# Patient Record
Sex: FEMALE
[De-identification: names, ages, dates, MRNs, and addresses within clinical notes are randomized per-mention and may not be internally consistent; named-entity substitution may affect disease eponyms.]

---

## 2017-12-07 LAB
HPV INTERPRETATION - HISTORICAL: NORMAL
HPV INTERPRETER - HISTORICAL: NORMAL

## 2018-01-10 ENCOUNTER — RECORDS - HEALTHEAST (OUTPATIENT)
Dept: ADMINISTRATIVE | Facility: OTHER | Age: 48
End: 2018-01-10

## 2018-01-10 LAB
BKR LAB AP ABNORMAL BLEEDING: NO
BKR LAB AP BIRTH CONTROL/HORMONES: ABNORMAL
BKR LAB AP CERVICAL APPEARANCE: NORMAL
BKR LAB AP GYN ADEQUACY: ABNORMAL
BKR LAB AP GYN INTERPRETATION: ABNORMAL
BKR LAB AP HPV REFLEX: ABNORMAL
BKR LAB AP LMP: 2015
BKR LAB AP PATIENT STATUS: ABNORMAL
BKR LAB AP PREVIOUS ABNORMAL: ABNORMAL
BKR LAB AP PREVIOUS NORMAL: 15
HIGH RISK?: YES
PATH REPORT.ADDENDUM SPEC: ABNORMAL
PATH REPORT.COMMENTS IMP SPEC: ABNORMAL
RESULT FLAG (HE HISTORICAL CONVERSION): ABNORMAL

## 2018-06-13 NOTE — PROGRESS NOTES
Jonatan Velez is a 46year old female who presents as a new patient to establish:       Patient has no acute complaints. Was previously seen by EMG, last in 2014. Moved to [place] and now has relocated back to [place]. Wants to re-establish.  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 Meds & Refills for this Visit:  Signed Prescriptions Disp Refills    Sertraline HCl 100 MG Oral Tab 180 tablet 1      Sig: Take 2 tablets (200 mg total) by mouth daily.            Imaging & Consults:

## 2018-09-11 ENCOUNTER — OFFICE VISIT (OUTPATIENT)
Dept: INTERNAL MEDICINE CLINIC | Facility: CLINIC | Age: 48
End: 2018-09-11
Payer: MEDICAID

## 2018-09-11 VITALS
WEIGHT: 142.38 LBS | RESPIRATION RATE: 14 BRPM | OXYGEN SATURATION: 98 % | SYSTOLIC BLOOD PRESSURE: 112 MMHG | HEART RATE: 82 BPM | BODY MASS INDEX: 24.31 KG/M2 | DIASTOLIC BLOOD PRESSURE: 70 MMHG | TEMPERATURE: 99 F | HEIGHT: 64 IN

## 2018-09-11 DIAGNOSIS — R10.9 RIGHT FLANK PAIN: Primary | ICD-10-CM

## 2018-09-11 DIAGNOSIS — M54.50 ACUTE RIGHT-SIDED LOW BACK PAIN WITHOUT SCIATICA: ICD-10-CM

## 2018-09-11 PROCEDURE — 99213 OFFICE O/P EST LOW 20 MIN: CPT | Performed by: NURSE PRACTITIONER

## 2018-09-11 RX ORDER — CYCLOBENZAPRINE HCL 10 MG
10 TABLET ORAL NIGHTLY PRN
Qty: 10 TABLET | Refills: 0 | Status: SHIPPED | OUTPATIENT
Start: 2018-09-11 | End: 2018-09-25

## 2018-09-11 NOTE — PROGRESS NOTES
Patient presents with:  Back Pain: comes and goes, does heat and ice, has muscle spasms, lower back now into right side.  Did start a active job but had to quit possible cause, a lot of heavy lifting      HPI:  Presents with approx history of intermittent r Anxiety     IBS (irritable bowel syndrome)     Depression     Lactose intolerance     Hemochromatosis     Lateral epicondylitis  of elbow     Body mass index between 19-24, adult     Cervical dysplasia        Current Outpatient Medications:  Cyclobenzaprin pain without sciatica- as above    No orders of the defined types were placed in this encounter.       Meds & Refills for this Visit:  Requested Prescriptions     Signed Prescriptions Disp Refills   • Cyclobenzaprine HCl 10 MG Oral Tab 10 tablet 0     Sig:

## 2018-09-11 NOTE — PATIENT INSTRUCTIONS
Take one cyclobenzaprine tab nightly, before bed, for three (3) nights. Then make take nightly as needed. This medication will likely make you drowsy. Do not drive, operate machinery or make important decisions after taking this medication.  D

## 2019-05-14 ENCOUNTER — OFFICE VISIT (OUTPATIENT)
Dept: INTERNAL MEDICINE CLINIC | Facility: CLINIC | Age: 49
End: 2019-05-14
Payer: COMMERCIAL

## 2019-05-14 VITALS
SYSTOLIC BLOOD PRESSURE: 106 MMHG | OXYGEN SATURATION: 99 % | WEIGHT: 136.19 LBS | HEIGHT: 64 IN | DIASTOLIC BLOOD PRESSURE: 72 MMHG | BODY MASS INDEX: 23.25 KG/M2 | RESPIRATION RATE: 14 BRPM | HEART RATE: 75 BPM | TEMPERATURE: 99 F

## 2019-05-14 DIAGNOSIS — M25.511 ACUTE PAIN OF RIGHT SHOULDER: ICD-10-CM

## 2019-05-14 DIAGNOSIS — R22.31 SUBCUTANEOUS MASS OF RIGHT THUMB: ICD-10-CM

## 2019-05-14 DIAGNOSIS — K05.219 ABSCESS OF UPPER GUM: Primary | ICD-10-CM

## 2019-05-14 PROCEDURE — 99214 OFFICE O/P EST MOD 30 MIN: CPT | Performed by: NURSE PRACTITIONER

## 2019-05-14 RX ORDER — AMOXICILLIN AND CLAVULANATE POTASSIUM 875; 125 MG/1; MG/1
1 TABLET, FILM COATED ORAL 2 TIMES DAILY
Qty: 20 TABLET | Refills: 0 | Status: SHIPPED | OUTPATIENT
Start: 2019-05-14 | End: 2019-05-24

## 2019-05-14 NOTE — PROGRESS NOTES
Patient presents with:  Mouth/Lip Problem: abcess in gums and drainage, couldn't get into Dentist for weeks  Lump: Right thumb  Shoulder Pain: Right side      HPI:  Presents with approx 5 day history of right upper gum pain and mass with yellow colored debora 6/20/07   • Hemochromatosis 2007    homozygous for C282Y mutation   • IBS (irritable bowel syndrome) 9/24/2003   • Labyrinthitis 1/6/2000   • Lactose intolerance    • Lateral epicondylitis 2/3/96    R   • Lipid screening +2/9/11   • Palpitations 3/3/06, 1/ shoulder w/o erythema, edema, masses, TTP or obvious deformity. Internal rotation and empty can test negative to increase pain. Ext: Right thumb noted with firm, minimally tender mass to dorsal surface of digit at DIP joint area. ROM WNL to thumb.  No trudy

## 2019-10-21 ENCOUNTER — OFFICE VISIT (OUTPATIENT)
Dept: INTERNAL MEDICINE CLINIC | Facility: CLINIC | Age: 49
End: 2019-10-21
Payer: COMMERCIAL

## 2019-10-21 VITALS
TEMPERATURE: 99 F | HEIGHT: 64 IN | HEART RATE: 81 BPM | WEIGHT: 137.19 LBS | BODY MASS INDEX: 23.42 KG/M2 | RESPIRATION RATE: 14 BRPM | OXYGEN SATURATION: 97 % | SYSTOLIC BLOOD PRESSURE: 110 MMHG | DIASTOLIC BLOOD PRESSURE: 64 MMHG

## 2019-10-21 DIAGNOSIS — Z13.228 SCREENING FOR METABOLIC DISORDER: ICD-10-CM

## 2019-10-21 DIAGNOSIS — Z13.220 SCREENING FOR LIPOID DISORDERS: ICD-10-CM

## 2019-10-21 DIAGNOSIS — Z13.29 SCREENING FOR THYROID DISORDER: ICD-10-CM

## 2019-10-21 DIAGNOSIS — M18.11 ARTHRITIS OF CARPOMETACARPAL (CMC) JOINT OF RIGHT THUMB: Primary | ICD-10-CM

## 2019-10-21 DIAGNOSIS — Z23 NEED FOR VACCINATION: ICD-10-CM

## 2019-10-21 DIAGNOSIS — Z12.11 COLON CANCER SCREENING: ICD-10-CM

## 2019-10-21 DIAGNOSIS — Z13.1 SCREENING FOR DIABETES MELLITUS: ICD-10-CM

## 2019-10-21 DIAGNOSIS — Z13.0 SCREENING FOR IRON DEFICIENCY ANEMIA: ICD-10-CM

## 2019-10-21 PROCEDURE — 99214 OFFICE O/P EST MOD 30 MIN: CPT | Performed by: STUDENT IN AN ORGANIZED HEALTH CARE EDUCATION/TRAINING PROGRAM

## 2019-10-21 PROCEDURE — 90686 IIV4 VACC NO PRSV 0.5 ML IM: CPT | Performed by: STUDENT IN AN ORGANIZED HEALTH CARE EDUCATION/TRAINING PROGRAM

## 2019-10-21 PROCEDURE — 90471 IMMUNIZATION ADMIN: CPT | Performed by: STUDENT IN AN ORGANIZED HEALTH CARE EDUCATION/TRAINING PROGRAM

## 2019-10-21 NOTE — PROGRESS NOTES
HPI:    Patient ID: Avelina Hair is a 44year old female. This is a new patient to me. Patient's medications, allergies, past medical, surgical, social and family histories were reviewed and updated as appropriate.     Joint Pain   This is a chronic Mouth/Throat: Oropharynx is clear and moist.   Eyes: Pupils are equal, round, and reactive to light. Conjunctivae and EOM are normal.   Neck: Normal range of motion. Neck supple.    Cardiovascular: Normal rate, regular rhythm, normal heart sounds and intact

## 2019-10-21 NOTE — PATIENT INSTRUCTIONS
Understanding Carpometacarpal Osteoarthritis     The base of the thumb where it meets the hand is called the carpometacarpal [place]) joint. This joint lets the thumb move freely in many directions. It also provides strength so the hand can grasp and . for a time  If these treatments don’t do enough to relieve severe pain, you may need surgery. There are several different types of surgery. In general, the goal is to ease pain and help you to be able to use the hand.   When to call your healthcare provider weight puts a lot of extra strain on weight-bearing joints of the lower back, hips, knees, feet and ankles.  If you are overweight, talk to your healthcare provider about a safe and effective weight loss program.  · Use anti-inflammatory medicines as prescr

## 2019-10-25 ENCOUNTER — TELEPHONE (OUTPATIENT)
Dept: INTERNAL MEDICINE CLINIC | Facility: CLINIC | Age: 49
End: 2019-10-25

## 2019-10-28 NOTE — TELEPHONE ENCOUNTER
Received fax requesting prior authorization for diclofenac gel. Unable to complete ePA. Called patient to obtain Rx coverage information, patient states she paid out of pocket for prescription, and prior authorization is not needed.

## 2019-12-26 ENCOUNTER — HOSPITAL ENCOUNTER (OUTPATIENT)
Age: 49
Discharge: HOME | End: 2019-12-26
Attending: FAMILY MEDICINE

## 2019-12-26 VITALS
HEART RATE: 104 BPM | RESPIRATION RATE: 18 BRPM | WEIGHT: 140 LBS | OXYGEN SATURATION: 99 % | SYSTOLIC BLOOD PRESSURE: 100 MMHG | HEIGHT: 64 IN | TEMPERATURE: 98 F | BODY MASS INDEX: 23.9 KG/M2 | DIASTOLIC BLOOD PRESSURE: 62 MMHG

## 2019-12-26 DIAGNOSIS — K52.9 GASTROENTERITIS: ICD-10-CM

## 2019-12-26 DIAGNOSIS — B00.9 HERPES SIMPLEX INFECTION OF SKIN: Primary | ICD-10-CM

## 2019-12-26 PROCEDURE — 99213 OFFICE O/P EST LOW 20 MIN: CPT

## 2019-12-26 PROCEDURE — 99204 OFFICE O/P NEW MOD 45 MIN: CPT

## 2019-12-26 RX ORDER — ONDANSETRON 8 MG/1
8 TABLET, ORALLY DISINTEGRATING ORAL EVERY 12 HOURS PRN
Qty: 10 TABLET | Refills: 0 | Status: SHIPPED | OUTPATIENT
Start: 2019-12-26 | End: 2020-01-05

## 2019-12-26 RX ORDER — VALACYCLOVIR HYDROCHLORIDE 1 G/1
1 TABLET, FILM COATED ORAL 3 TIMES DAILY
Qty: 21 TABLET | Refills: 0 | Status: SHIPPED | OUTPATIENT
Start: 2019-12-26 | End: 2020-01-02

## 2019-12-26 RX ORDER — ONDANSETRON 4 MG/1
8 TABLET, ORALLY DISINTEGRATING ORAL ONCE
Status: COMPLETED | OUTPATIENT
Start: 2019-12-26 | End: 2019-12-26

## 2019-12-26 NOTE — ED PROVIDER NOTES
Patient Seen in: [facility]      History   Patient presents with:  Rash Skin Problem  Vomiting    Stated Complaint: poss shingles    HPI    *57-year-old female presents to the immediate care today with chief complaints  1. Past Surgical History:   Procedure Laterality Date   • DENTAL SURGERY PROCEDURE      wisdom teeth   • LOOP ELECTRO SURGICAL EXCISION N/A 6/5/2014    Performed by Aguilar Roberson MD at [facility] MAIN OR   • OTHER SURGICAL HISTORY  5/2003    Colpos normal, no murmur, click, rub or gallop, regular rate and rhythm  Abdomen: soft, non-tender; bowel sounds normal; no masses,  no organomegaly  Extremities: extremities normal, atraumatic, no cyanosis or edema  Pulses: 2+ and symmetric  Skin:    Description Prescribed:  Current Discharge Medication List    START taking these medications    valACYclovir HCl 1 G Oral Tab  Take 1 tablet (1,000 mg total) by mouth 3 (three) times daily for 7 days.   Qty: 21 tablet Refills: 0    ondansetron 8 MG Oral Tablet Dispersi

## 2019-12-26 NOTE — ED INITIAL ASSESSMENT (HPI)
The patient is here for possible shingles to the right cheek/ear area. She states earlier in the week she started with tingling to the right side of the face and head and states it has gone down her right side.   Yesterday the rash showed up and she states

## 2020-03-15 ENCOUNTER — PATIENT MESSAGE (OUTPATIENT)
Dept: INTERNAL MEDICINE CLINIC | Facility: CLINIC | Age: 50
End: 2020-03-15

## 2020-03-15 DIAGNOSIS — F32.A DEPRESSION, UNSPECIFIED DEPRESSION TYPE: Primary | ICD-10-CM

## 2020-03-16 RX ORDER — SERTRALINE HYDROCHLORIDE 100 MG/1
200 TABLET, FILM COATED ORAL
Qty: 180 TABLET | Refills: 0 | Status: SHIPPED | OUTPATIENT
Start: 2020-03-16 | End: 2020-06-16

## 2020-03-16 NOTE — TELEPHONE ENCOUNTER
Last OV relevant to medication: 10/21/19  Last refill date: 11/4/19     #/refills: 180/0  When pt was asked to return for OV: 2 months  Upcoming appt/reason: none    From: Keira Valdes  To: Rebel Mancilla MD  Sent: 3/15/2020  1:17 PM CDT  Subject: Leatha Cadet

## 2020-03-31 ENCOUNTER — TELEPHONE (OUTPATIENT)
Dept: INTERNAL MEDICINE CLINIC | Facility: CLINIC | Age: 50
End: 2020-03-31

## 2020-03-31 ENCOUNTER — VIRTUAL PHONE E/M (OUTPATIENT)
Dept: INTERNAL MEDICINE CLINIC | Facility: CLINIC | Age: 50
End: 2020-03-31
Payer: COMMERCIAL

## 2020-03-31 DIAGNOSIS — Z02.9 ADMINISTRATIVE ENCOUNTER: Primary | ICD-10-CM

## 2020-03-31 DIAGNOSIS — A60.00 GENITAL HERPES SIMPLEX, UNSPECIFIED SITE: Primary | ICD-10-CM

## 2020-03-31 PROCEDURE — 99213 OFFICE O/P EST LOW 20 MIN: CPT | Performed by: STUDENT IN AN ORGANIZED HEALTH CARE EDUCATION/TRAINING PROGRAM

## 2020-03-31 NOTE — TELEPHONE ENCOUNTER
Virtual/Telephone Check-In    Julienne Villarreal verbally consents to a Virtual/Telephone Check-In service on 03/31/20. Patient understands and accepts financial responsibility for any deductible, co-insurance and/or co-pays associated with this service. directly to an [facility] or ED or MD office.   - Advised staying home when sick. - Recommended to practice social distancing and staying 6 ft away from other individuals.   - Educated on wearing a mask when around others if having respiratory symptoms of cough,

## 2020-04-23 ENCOUNTER — E-VISIT (OUTPATIENT)
Dept: FAMILY MEDICINE CLINIC | Facility: CLINIC | Age: 50
End: 2020-04-23

## 2020-04-23 DIAGNOSIS — J02.9 PHARYNGITIS, UNSPECIFIED ETIOLOGY: Primary | ICD-10-CM

## 2020-04-23 DIAGNOSIS — H92.09 OTALGIA, UNSPECIFIED LATERALITY: ICD-10-CM

## 2020-04-23 PROCEDURE — 99422 OL DIG E/M SVC 11-20 MIN: CPT | Performed by: NURSE PRACTITIONER

## 2020-04-23 RX ORDER — AMOXICILLIN 875 MG/1
875 TABLET, COATED ORAL 2 TIMES DAILY
Qty: 20 TABLET | Refills: 0 | Status: SHIPPED | OUTPATIENT
Start: 2020-04-23 | End: 2020-07-01 | Stop reason: ALTCHOICE

## 2020-04-23 NOTE — PROGRESS NOTES
Hussain Garcia is a 44year old female. HPI:   See answers to questions above. Current Outpatient Medications   Medication Sig Dispense Refill   • valACYclovir HCl 500 MG Oral Tab Take 1 tablet (500 mg total) by mouth daily.  90 tablet 0   • Fay Josue Alcohol/week: 3.0 standard drinks      Types: 3 Cans of beer per week      Frequency: 2-4 times a month    Drug use: No      Comment: quit        ASSESSMENT AND PLAN:     Pharyngitis, unspecified etiology  (primary encounter diagnosis)  Otalgia, unspec · Remember: unless a sore throat is caused by a bacterial infection, antibiotics won’t help you. Prevent future sore throats  Prevention tips include the following:  · Stop smoking or reduce contact with secondhand smoke.  Smoke irritates the tender throat

## 2020-06-09 DIAGNOSIS — F32.A DEPRESSION, UNSPECIFIED DEPRESSION TYPE: ICD-10-CM

## 2020-06-09 NOTE — TELEPHONE ENCOUNTER
The patient is due for PE. She also has labs overdue. Please have her make an apt for PE and remind her to do labs prior and scripts will be sent. Thanks.

## 2020-06-12 NOTE — TELEPHONE ENCOUNTER
Sent [employer] message regarding scheduling office visit and having fasting labs drawn prior to appointment.

## 2020-06-16 RX ORDER — SERTRALINE HYDROCHLORIDE 100 MG/1
200 TABLET, FILM COATED ORAL DAILY
Qty: 60 TABLET | Refills: 0 | Status: SHIPPED | OUTPATIENT
Start: 2020-06-16 | End: 2020-07-14

## 2020-06-16 NOTE — TELEPHONE ENCOUNTER
30 day supply sent since she has not yet made an apt for PE. Another refill will not be sent until an apt is made. Thanks.

## 2020-07-01 ENCOUNTER — OFFICE VISIT (OUTPATIENT)
Dept: INTERNAL MEDICINE CLINIC | Facility: CLINIC | Age: 50
End: 2020-07-01
Payer: COMMERCIAL

## 2020-07-01 VITALS
RESPIRATION RATE: 16 BRPM | TEMPERATURE: 98 F | SYSTOLIC BLOOD PRESSURE: 104 MMHG | OXYGEN SATURATION: 99 % | BODY MASS INDEX: 23.67 KG/M2 | DIASTOLIC BLOOD PRESSURE: 70 MMHG | HEART RATE: 70 BPM | HEIGHT: 64 IN | WEIGHT: 138.63 LBS

## 2020-07-01 DIAGNOSIS — Z12.31 ENCOUNTER FOR SCREENING MAMMOGRAM FOR BREAST CANCER: ICD-10-CM

## 2020-07-01 DIAGNOSIS — F32.A DEPRESSION, UNSPECIFIED DEPRESSION TYPE: Primary | ICD-10-CM

## 2020-07-01 PROCEDURE — 99213 OFFICE O/P EST LOW 20 MIN: CPT | Performed by: STUDENT IN AN ORGANIZED HEALTH CARE EDUCATION/TRAINING PROGRAM

## 2020-07-01 RX ORDER — DIPHENHYDRAMINE HYDROCHLORIDE 12.5 MG/1
12.5 BAR, CHEWABLE ORAL NIGHTLY PRN
COMMUNITY

## 2020-07-01 RX ORDER — NAPROXEN 250 MG/1
250 TABLET ORAL AS NEEDED
COMMUNITY

## 2020-07-01 RX ORDER — IBUPROFEN 200 MG
200 TABLET ORAL EVERY 6 HOURS PRN
COMMUNITY
End: 2021-03-17 | Stop reason: ALTCHOICE

## 2020-07-01 NOTE — PROGRESS NOTES
[facility]    REASON FOR VISIT:    Current Complaints: Patient presents with:   Anxiety: medication concern, PHQ2=2, CSSR=1  Depression: medication concern  Menopause: started in 2016,concerned if menopause is effecting anxiety and depression headaches, dizziness, extremity weakness. HENT: Negative for hearing loss, congestion, sore throat and neck pain. Eyes: Negative for pain and visual disturbance. Respiratory: Negative for cough and shortness of breath.     Cardiovascular: Negative for Alert and oriented to person, place, and time. Cranial nerves are intact,motor and sensory are grossly intact. Normal reflexes. Skin: Skin is warm. No rash noted. No erythema.    Psychiatric: Normal mood and affect and behavior is normal.     ASSESSMENT A

## 2020-07-14 DIAGNOSIS — F32.A DEPRESSION, UNSPECIFIED DEPRESSION TYPE: ICD-10-CM

## 2020-07-14 RX ORDER — SERTRALINE HYDROCHLORIDE 100 MG/1
TABLET, FILM COATED ORAL
Qty: 60 TABLET | Refills: 0 | Status: SHIPPED | OUTPATIENT
Start: 2020-07-14 | End: 2020-08-14

## 2020-08-14 DIAGNOSIS — F32.A DEPRESSION, UNSPECIFIED DEPRESSION TYPE: ICD-10-CM

## 2020-08-14 RX ORDER — SERTRALINE HYDROCHLORIDE 100 MG/1
TABLET, FILM COATED ORAL
Qty: 60 TABLET | Refills: 0 | Status: SHIPPED | OUTPATIENT
Start: 2020-08-14 | End: 2020-09-08

## 2020-08-14 NOTE — TELEPHONE ENCOUNTER
Last OV relevant to medication: 7/1/2020  Last refill date: 7/14/2020     #/refills: 60/0  When pt was asked to return for OV: PRN  Upcoming appt/reason: 7/29/2020: PE - Cancelled    [facility] Referral Placed at 7/1/2020 visit - further evaluation and med

## 2020-09-01 LAB
AMB EXT CHOL/HDL RATIO: 2.1
AMB EXT CHOLESTEROL, TOTAL: 191 MG/DL
AMB EXT CREATININE: 0.62 MG/DL
AMB EXT GLUCOSE: 98 MG/DL
AMB EXT HDL CHOLESTEROL: 91 MG/DL
AMB EXT HEMATOCRIT: 38.7
AMB EXT HEMOGLOBIN: 13.3
AMB EXT LDL CHOLESTEROL, DIRECT: 89 MG/DL
AMB EXT MCV: 99
AMB EXT NON HDL CHOL: 100 MG/DL
AMB EXT PLATELETS: 209
AMB EXT TRIGLYCERIDES: 58 MG/DL
AMB EXT WBC: 4.8 X10(3)UL

## 2020-09-08 ENCOUNTER — TELEPHONE (OUTPATIENT)
Dept: INTERNAL MEDICINE CLINIC | Facility: CLINIC | Age: 50
End: 2020-09-08

## 2020-09-08 DIAGNOSIS — F32.A DEPRESSION, UNSPECIFIED DEPRESSION TYPE: ICD-10-CM

## 2020-09-11 DIAGNOSIS — F32.A DEPRESSION, UNSPECIFIED DEPRESSION TYPE: ICD-10-CM

## 2020-09-11 NOTE — TELEPHONE ENCOUNTER
Pt was to estab with mirlande hazel. [employer] sent to pt.     Last OV relevant to medication: 7/1/20  Last refill date: 8/14/20     #/refills: 60/0  When pt was asked to return for OV: annual due  Upcoming appt/reason: none

## 2020-09-12 RX ORDER — SERTRALINE HYDROCHLORIDE 100 MG/1
200 TABLET, FILM COATED ORAL DAILY
Qty: 60 TABLET | Refills: 0 | Status: SHIPPED | OUTPATIENT
Start: 2020-09-12 | End: 2020-09-15

## 2020-09-12 RX ORDER — SERTRALINE HYDROCHLORIDE 100 MG/1
200 TABLET, FILM COATED ORAL DAILY
Qty: 60 TABLET | Refills: 0 | OUTPATIENT
Start: 2020-09-12

## 2020-09-14 ENCOUNTER — PATIENT MESSAGE (OUTPATIENT)
Dept: INTERNAL MEDICINE CLINIC | Facility: CLINIC | Age: 50
End: 2020-09-14

## 2020-09-14 DIAGNOSIS — F32.A DEPRESSION, UNSPECIFIED DEPRESSION TYPE: ICD-10-CM

## 2020-09-14 NOTE — TELEPHONE ENCOUNTER
Left detailed message per HIPAA. Advised of [address] [facility] [employer] Navigator trying to contact pt. Provided [facility] [employer] Navigator's contact information to call back. Advised a 30 day supply was sent, but further refills to come from nadege ROMAN to Payam Philip

## 2020-09-14 NOTE — TELEPHONE ENCOUNTER
Noted per pt's [employer] message, pt has been \"playing phone tag with someone from [facility]"    [facility] Navigator order placed. To [facility] Navigator to call back & assist pt to see behavioral specialist for further evaluation & medication adjustment.  Pt cu

## 2020-09-15 RX ORDER — SERTRALINE HYDROCHLORIDE 100 MG/1
200 TABLET, FILM COATED ORAL DAILY
Qty: 180 TABLET | Refills: 0 | Status: SHIPPED | OUTPATIENT
Start: 2020-09-15 | End: 2020-12-20

## 2020-09-15 NOTE — TELEPHONE ENCOUNTER
From: Zara Aparicio  To: Edda Stark MD  Sent: 9/14/2020 4:41 PM CDT  Subject: Prescription Question    Hi there. I had not connected with someone from [facility], so I took another route. I don't need any further referrals.  Could you please fill my

## 2020-09-15 NOTE — TELEPHONE ENCOUNTER
Dr Tidwell Be[place] with pt. Reviewed 7/1/2020 OV notes. Pt states at time of that appt she was very stressed from new job, couldn't sleep at night. State she asked about medical marijuana.  Thought she was being referred to [facility] for medical mar

## 2020-09-15 NOTE — TELEPHONE ENCOUNTER
Last OV relevant to medication: 7/1/2020  Last refill date: 9/12/2020     #/refills: 180  When pt was asked to return for OV: if s/s persist/worsen  Upcoming appt/reason: none    See pt's MyChart message.

## 2020-12-17 DIAGNOSIS — F32.A DEPRESSION, UNSPECIFIED DEPRESSION TYPE: ICD-10-CM

## 2020-12-19 NOTE — TELEPHONE ENCOUNTER
Last OV relevant to medication: 7/1/2020, anxiety  Last refill date: 9/15/2020    #/refills: 180-0  When pt was asked to return for OV: None listed  Upcoming appt/reason: None scheduled

## 2020-12-20 RX ORDER — SERTRALINE HYDROCHLORIDE 100 MG/1
TABLET, FILM COATED ORAL
Qty: 180 TABLET | Refills: 0 | Status: SHIPPED | OUTPATIENT
Start: 2020-12-20 | End: 2021-03-14

## 2021-01-04 NOTE — TELEPHONE ENCOUNTER
Prescription was filled for 30 days. During the last visit with Dr. Ernestine Goyal the patient did have suicidal ideation without plan she does need to be seen by psychiatry. Please urged her to call behavioral health back if she has their phone number.   If sh Patient reports headache and fever. Denies chest pain, shortness of breath, syncope or dizziness.

## 2021-02-16 ENCOUNTER — HOSPITAL ENCOUNTER (OUTPATIENT)
Dept: MAMMOGRAPHY | Age: 51
Discharge: HOME | End: 2021-02-16
Attending: STUDENT IN AN ORGANIZED HEALTH CARE EDUCATION/TRAINING PROGRAM
Payer: COMMERCIAL

## 2021-02-16 DIAGNOSIS — Z12.31 ENCOUNTER FOR SCREENING MAMMOGRAM FOR BREAST CANCER: ICD-10-CM

## 2021-02-16 PROCEDURE — 77067 SCR MAMMO BI INCL CAD: CPT | Performed by: STUDENT IN AN ORGANIZED HEALTH CARE EDUCATION/TRAINING PROGRAM

## 2021-02-16 PROCEDURE — 77063 BREAST TOMOSYNTHESIS BI: CPT | Performed by: STUDENT IN AN ORGANIZED HEALTH CARE EDUCATION/TRAINING PROGRAM

## 2021-03-14 DIAGNOSIS — F32.A DEPRESSION, UNSPECIFIED DEPRESSION TYPE: ICD-10-CM

## 2021-03-15 DIAGNOSIS — F32.A DEPRESSION, UNSPECIFIED DEPRESSION TYPE: ICD-10-CM

## 2021-03-16 ENCOUNTER — TELEPHONE (OUTPATIENT)
Dept: INTERNAL MEDICINE CLINIC | Facility: CLINIC | Age: 51
End: 2021-03-16

## 2021-03-16 RX ORDER — SERTRALINE HYDROCHLORIDE 100 MG/1
200 TABLET, FILM COATED ORAL DAILY
Qty: 180 TABLET | Refills: 0 | Status: SHIPPED | OUTPATIENT
Start: 2021-03-16 | End: 2021-08-11

## 2021-03-16 NOTE — TELEPHONE ENCOUNTER
Spoke with pt who had scheduled OV for tomorrow. Has had \"strange\" calf pain, bilateral for month or so. Denied redness, swelling, warmth. Intermittent heart racing, has been going on for years, can sometimes calm it with deep breaths.    Denied chest p

## 2021-03-16 NOTE — TELEPHONE ENCOUNTER
Last OV relevant to medication: 2020, depression  Last refill date: 2020     #/refills: 180-0  When pt was asked to return for OV: prn  Upcoming appt/reason: 3/17/21, multiple issues; 21, PE  Was pt informed of any over due labs:

## 2021-03-17 ENCOUNTER — OFFICE VISIT (OUTPATIENT)
Dept: INTERNAL MEDICINE CLINIC | Facility: CLINIC | Age: 51
End: 2021-03-17
Payer: COMMERCIAL

## 2021-03-17 VITALS
HEIGHT: 64 IN | TEMPERATURE: 98 F | DIASTOLIC BLOOD PRESSURE: 68 MMHG | BODY MASS INDEX: 24.82 KG/M2 | OXYGEN SATURATION: 99 % | HEART RATE: 71 BPM | RESPIRATION RATE: 16 BRPM | SYSTOLIC BLOOD PRESSURE: 106 MMHG | WEIGHT: 145.38 LBS

## 2021-03-17 DIAGNOSIS — R00.2 PALPITATIONS: Primary | ICD-10-CM

## 2021-03-17 DIAGNOSIS — Z12.11 COLON CANCER SCREENING: ICD-10-CM

## 2021-03-17 DIAGNOSIS — F41.9 ANXIETY: ICD-10-CM

## 2021-03-17 DIAGNOSIS — M79.661 BILATERAL CALF PAIN: ICD-10-CM

## 2021-03-17 DIAGNOSIS — M79.662 BILATERAL CALF PAIN: ICD-10-CM

## 2021-03-17 PROCEDURE — 93000 ELECTROCARDIOGRAM COMPLETE: CPT | Performed by: STUDENT IN AN ORGANIZED HEALTH CARE EDUCATION/TRAINING PROGRAM

## 2021-03-17 PROCEDURE — 3074F SYST BP LT 130 MM HG: CPT | Performed by: STUDENT IN AN ORGANIZED HEALTH CARE EDUCATION/TRAINING PROGRAM

## 2021-03-17 PROCEDURE — 99214 OFFICE O/P EST MOD 30 MIN: CPT | Performed by: STUDENT IN AN ORGANIZED HEALTH CARE EDUCATION/TRAINING PROGRAM

## 2021-03-17 PROCEDURE — 3008F BODY MASS INDEX DOCD: CPT | Performed by: STUDENT IN AN ORGANIZED HEALTH CARE EDUCATION/TRAINING PROGRAM

## 2021-03-17 PROCEDURE — 3078F DIAST BP <80 MM HG: CPT | Performed by: STUDENT IN AN ORGANIZED HEALTH CARE EDUCATION/TRAINING PROGRAM

## 2021-03-17 RX ORDER — SERTRALINE HYDROCHLORIDE 100 MG/1
200 TABLET, FILM COATED ORAL DAILY
Qty: 180 TABLET | Refills: 0 | OUTPATIENT
Start: 2021-03-17

## 2021-03-17 NOTE — PROGRESS NOTES
[facility]    REASON FOR VISIT:    Current Complaints: Patient presents with:  Leg Pain: calf pain both legs  Breathing Problem: patient states that she has to take a deep breath sometimes just to breathe  Tachycardia: patient feels like heart is diphenhydrAMINE HCl (BENADRYL ALLERGY CHILDRENS) 12.5 MG Oral Chew Tab Chew 12.5 mg by mouth nightly as needed for Allergies.             PAST MEDICAL, SOCIAL  AND FAMILY HISTORY:  Tobacco:     Smoking status: Former Smoker 1.00 Packs/day For 20.00 Years light reflex. B/l external auditory canals without erythema or edema. Mucous membranes moist, dentition normal.  Oropharynx without erythema, exudate or tonsillar hypertrophy. Eyes: EOM are normal. Pupils are equal, round, and reactive to light.  No scler persistent. She verbalized understanding and agrees with the plan. -     ELECTROCARDIOGRAM, COMPLETE    Bilateral calf pain  Normal exam.  Improve hydration, can try OTC magnesium supplementation. RTC if no improvement.     Anxiety  Patient feels is well

## 2021-03-18 ENCOUNTER — TELEPHONE (OUTPATIENT)
Dept: INTERNAL MEDICINE CLINIC | Facility: CLINIC | Age: 51
End: 2021-03-18

## 2021-03-18 DIAGNOSIS — Z13.29 SCREENING FOR THYROID DISORDER: Primary | ICD-10-CM

## 2021-03-18 NOTE — TELEPHONE ENCOUNTER
Incoming (mail or fax):   In Person via Pt  Received from:  Eddie Jean  Documentation given to:  Test Results [employer]

## 2021-03-20 ENCOUNTER — IMMUNIZATION (OUTPATIENT)
Dept: LAB | Age: 51
End: 2021-03-20
Attending: HOSPITALIST
Payer: COMMERCIAL

## 2021-03-20 DIAGNOSIS — Z23 NEED FOR VACCINATION: Primary | ICD-10-CM

## 2021-03-20 PROCEDURE — 0001A SARSCOV2 VAC 30MCG/0.3ML IM: CPT

## 2021-03-20 NOTE — TELEPHONE ENCOUNTER
Reviewed her labs from work health screening. All within normal limits. Unfortunately, her thyroid function test was not checked and this is important to determine that its not a cause of her palpitations.   Please order TSH with reflex to free T4 and adv

## 2021-03-23 ENCOUNTER — MED REC SCAN ONLY (OUTPATIENT)
Dept: INTERNAL MEDICINE CLINIC | Facility: CLINIC | Age: 51
End: 2021-03-23

## 2021-04-10 ENCOUNTER — IMMUNIZATION (OUTPATIENT)
Dept: LAB | Age: 51
End: 2021-04-10
Attending: HOSPITALIST
Payer: COMMERCIAL

## 2021-04-10 DIAGNOSIS — Z23 NEED FOR VACCINATION: Primary | ICD-10-CM

## 2021-04-10 PROCEDURE — 0002A SARSCOV2 VAC 30MCG/0.3ML IM: CPT

## 2021-08-11 ENCOUNTER — OFFICE VISIT (OUTPATIENT)
Dept: INTERNAL MEDICINE CLINIC | Facility: CLINIC | Age: 51
End: 2021-08-11
Payer: COMMERCIAL

## 2021-08-11 VITALS
WEIGHT: 139 LBS | BODY MASS INDEX: 23.73 KG/M2 | HEART RATE: 84 BPM | HEIGHT: 64 IN | SYSTOLIC BLOOD PRESSURE: 104 MMHG | TEMPERATURE: 97 F | DIASTOLIC BLOOD PRESSURE: 76 MMHG | RESPIRATION RATE: 14 BRPM | OXYGEN SATURATION: 96 %

## 2021-08-11 DIAGNOSIS — Z00.00 PHYSICAL EXAM: ICD-10-CM

## 2021-08-11 DIAGNOSIS — Z13.29 SCREENING FOR THYROID DISORDER: ICD-10-CM

## 2021-08-11 DIAGNOSIS — R42 VERTIGO: Primary | ICD-10-CM

## 2021-08-11 DIAGNOSIS — F32.A DEPRESSION, UNSPECIFIED DEPRESSION TYPE: ICD-10-CM

## 2021-08-11 DIAGNOSIS — F41.9 ANXIETY AND DEPRESSION: ICD-10-CM

## 2021-08-11 DIAGNOSIS — F32.A ANXIETY AND DEPRESSION: ICD-10-CM

## 2021-08-11 DIAGNOSIS — Z13.220 SCREENING FOR CHOLESTEROL LEVEL: ICD-10-CM

## 2021-08-11 PROCEDURE — 3008F BODY MASS INDEX DOCD: CPT | Performed by: NURSE PRACTITIONER

## 2021-08-11 PROCEDURE — 3074F SYST BP LT 130 MM HG: CPT | Performed by: NURSE PRACTITIONER

## 2021-08-11 PROCEDURE — 99214 OFFICE O/P EST MOD 30 MIN: CPT | Performed by: NURSE PRACTITIONER

## 2021-08-11 PROCEDURE — 3078F DIAST BP <80 MM HG: CPT | Performed by: NURSE PRACTITIONER

## 2021-08-11 RX ORDER — SERTRALINE HYDROCHLORIDE 100 MG/1
200 TABLET, FILM COATED ORAL DAILY
Qty: 180 TABLET | Refills: 1 | Status: SHIPPED | OUTPATIENT
Start: 2021-08-11

## 2021-08-11 NOTE — PATIENT INSTRUCTIONS
Get your labs done. You should be fasting for at least 10 hours. If you take a multivitamin with Biotin or any biotin product it should be held for 3 days prior to getting your labs done.     Send in your stool card for colon cancer screening    Do the exer

## 2021-08-11 NOTE — PROGRESS NOTES
Joslyn Ray is a 46year old female. CHIEF COMPLAINT   Med check, vertigo    HPI:   Patient is here for a med check. She takes sertraline for depression mainly. Also has some anxiety recently due to stress.   She is not having any panic attacks but Years: 20.00        Pack years: 21        Quit date: 2003        Years since quittin.6      Smokeless tobacco: Never Used    Vaping Use      Vaping Use: Never used    Alcohol use:  Yes      Alcohol/week: 3.0 standard drinks      Types: 3 Cans of be 28 10/01/2014    CHRISHDDARRELLO 2.10 09/01/2020    Dom[place] 100 09/01/2020      Lab Results   Component Value Date    TSH 2.230 10/01/2014        ASSESSMENT AND PLAN:   1. Vertigo  -Has a history of vertigo. Has been experiencing mild vertigo.   [place] is

## 2021-08-16 ENCOUNTER — LAB ENCOUNTER (OUTPATIENT)
Dept: LAB | Age: 51
End: 2021-08-16
Attending: NURSE PRACTITIONER
Payer: COMMERCIAL

## 2021-08-16 ENCOUNTER — OFFICE VISIT (OUTPATIENT)
Dept: INTERNAL MEDICINE CLINIC | Facility: CLINIC | Age: 51
End: 2021-08-16
Payer: COMMERCIAL

## 2021-08-16 VITALS
HEART RATE: 68 BPM | TEMPERATURE: 98 F | OXYGEN SATURATION: 97 % | DIASTOLIC BLOOD PRESSURE: 60 MMHG | RESPIRATION RATE: 16 BRPM | SYSTOLIC BLOOD PRESSURE: 94 MMHG | HEIGHT: 64.5 IN | BODY MASS INDEX: 23.95 KG/M2 | WEIGHT: 142 LBS

## 2021-08-16 DIAGNOSIS — Z12.11 SCREENING FOR COLON CANCER: ICD-10-CM

## 2021-08-16 DIAGNOSIS — Z13.220 SCREENING FOR CHOLESTEROL LEVEL: ICD-10-CM

## 2021-08-16 DIAGNOSIS — Z00.00 ENCOUNTER FOR ANNUAL PHYSICAL EXAM: Primary | ICD-10-CM

## 2021-08-16 DIAGNOSIS — Z12.4 ENCOUNTER FOR SCREENING FOR CERVICAL CANCER: ICD-10-CM

## 2021-08-16 DIAGNOSIS — Z11.51 SCREENING FOR HUMAN PAPILLOMAVIRUS (HPV): ICD-10-CM

## 2021-08-16 DIAGNOSIS — Z13.29 SCREENING FOR THYROID DISORDER: ICD-10-CM

## 2021-08-16 LAB
ALBUMIN SERPL-MCNC: 3.7 G/DL (ref 3.4–5)
ALBUMIN/GLOB SERPL: 1.1 {RATIO} (ref 1–2)
ALP LIVER SERPL-CCNC: 108 U/L
ALT SERPL-CCNC: 28 U/L
ANION GAP SERPL CALC-SCNC: 5 MMOL/L (ref 0–18)
AST SERPL-CCNC: 19 U/L (ref 15–37)
BASOPHILS # BLD AUTO: 0.04 X10(3) UL (ref 0–0.2)
BASOPHILS NFR BLD AUTO: 0.9 %
BILIRUB SERPL-MCNC: 0.3 MG/DL (ref 0.1–2)
BUN BLD-MCNC: 11 MG/DL (ref 7–18)
CALCIUM BLD-MCNC: 8.9 MG/DL (ref 8.5–10.1)
CHLORIDE SERPL-SCNC: 107 MMOL/L (ref 98–112)
CHOLEST SMN-MCNC: 184 MG/DL (ref ?–200)
CO2 SERPL-SCNC: 26 MMOL/L (ref 21–32)
CREAT BLD-MCNC: 0.56 MG/DL
EOSINOPHIL # BLD AUTO: 0.15 X10(3) UL (ref 0–0.7)
EOSINOPHIL NFR BLD AUTO: 3.2 %
ERYTHROCYTE [DISTWIDTH] IN BLOOD BY AUTOMATED COUNT: 11.9 %
GLOBULIN PLAS-MCNC: 3.5 G/DL (ref 2.8–4.4)
GLUCOSE BLD-MCNC: 96 MG/DL (ref 70–99)
HCT VFR BLD AUTO: 40.2 %
HDLC SERPL-MCNC: 95 MG/DL (ref 40–59)
HGB BLD-MCNC: 13.2 G/DL
IMM GRANULOCYTES # BLD AUTO: 0.01 X10(3) UL (ref 0–1)
IMM GRANULOCYTES NFR BLD: 0.2 %
LDLC SERPL CALC-MCNC: 80 MG/DL (ref ?–100)
LYMPHOCYTES # BLD AUTO: 1.43 X10(3) UL (ref 1–4)
LYMPHOCYTES NFR BLD AUTO: 30.5 %
M PROTEIN MFR SERPL ELPH: 7.2 G/DL (ref 6.4–8.2)
MCH RBC QN AUTO: 33.4 PG (ref 26–34)
MCHC RBC AUTO-ENTMCNC: 32.8 G/DL (ref 31–37)
MCV RBC AUTO: 101.8 FL
MONOCYTES # BLD AUTO: 0.5 X10(3) UL (ref 0.1–1)
MONOCYTES NFR BLD AUTO: 10.7 %
NEUTROPHILS # BLD AUTO: 2.56 X10 (3) UL (ref 1.5–7.7)
NEUTROPHILS # BLD AUTO: 2.56 X10(3) UL (ref 1.5–7.7)
NEUTROPHILS NFR BLD AUTO: 54.5 %
NONHDLC SERPL-MCNC: 89 MG/DL (ref ?–130)
OSMOLALITY SERPL CALC.SUM OF ELEC: 285 MOSM/KG (ref 275–295)
PATIENT FASTING Y/N/NP: YES
PATIENT FASTING Y/N/NP: YES
PLATELET # BLD AUTO: 217 10(3)UL (ref 150–450)
POTASSIUM SERPL-SCNC: 4.4 MMOL/L (ref 3.5–5.1)
RBC # BLD AUTO: 3.95 X10(6)UL
SODIUM SERPL-SCNC: 138 MMOL/L (ref 136–145)
TRIGL SERPL-MCNC: 46 MG/DL (ref 30–149)
TSI SER-ACNC: 1.49 MIU/ML (ref 0.36–3.74)
VLDLC SERPL CALC-MCNC: 7 MG/DL (ref 0–30)
WBC # BLD AUTO: 4.7 X10(3) UL (ref 4–11)

## 2021-08-16 PROCEDURE — 36415 COLL VENOUS BLD VENIPUNCTURE: CPT | Performed by: NURSE PRACTITIONER

## 2021-08-16 PROCEDURE — 87624 HPV HI-RISK TYP POOLED RSLT: CPT | Performed by: NURSE PRACTITIONER

## 2021-08-16 PROCEDURE — 80053 COMPREHEN METABOLIC PANEL: CPT | Performed by: NURSE PRACTITIONER

## 2021-08-16 PROCEDURE — 88175 CYTOPATH C/V AUTO FLUID REDO: CPT | Performed by: NURSE PRACTITIONER

## 2021-08-16 PROCEDURE — 80061 LIPID PANEL: CPT | Performed by: NURSE PRACTITIONER

## 2021-08-16 PROCEDURE — 99396 PREV VISIT EST AGE 40-64: CPT | Performed by: NURSE PRACTITIONER

## 2021-08-16 PROCEDURE — 3078F DIAST BP <80 MM HG: CPT | Performed by: NURSE PRACTITIONER

## 2021-08-16 PROCEDURE — 3008F BODY MASS INDEX DOCD: CPT | Performed by: NURSE PRACTITIONER

## 2021-08-16 PROCEDURE — 84443 ASSAY THYROID STIM HORMONE: CPT | Performed by: NURSE PRACTITIONER

## 2021-08-16 PROCEDURE — 3074F SYST BP LT 130 MM HG: CPT | Performed by: NURSE PRACTITIONER

## 2021-08-16 PROCEDURE — 85025 COMPLETE CBC W/AUTO DIFF WBC: CPT | Performed by: NURSE PRACTITIONER

## 2021-08-16 NOTE — PATIENT INSTRUCTIONS
Check with your insurance to verify coverage for the Shingrix vaccine for shingles. Also check to see where you can go to get the vaccine. Get your labs done. You should be fasting for at least 10 hours.  If you take a multivitamin with Biotin or any

## 2021-08-16 NOTE — PROGRESS NOTES
[address] [place] [address] COMPLAINT       Annual Physical Exam    HPI:   Ángela Singleton is a 46year old female who presents for a complete physical exam.     Wt Readings from Last 6 Encounters:  08/16/21 : 142 lb (64.4 kg)  08/11/21 : 139 lb (63 kg)  03/17/21 : 145 lb 6.4 mutation   • IBS (irritable bowel syndrome) 9/24/2003   • Labyrinthitis 1/6/2000   • Lactose intolerance    • Lateral epicondylitis 2/3/96    R   • Lipid screening +2/9/11   • Palpitations 3/3/06, 1/27/11   • PMS (premenstrual syndrome) 3/2000   • Seasonal (1.638 m)   Wt 142 lb (64.4 kg)   SpO2 97%   BMI 24.00 kg/m²   Body mass index is 24 kg/m².    GENERAL: well developed, well nourished,in no apparent distress  SKIN: no rashes,no suspicious lesions  HEENT: atraumatic, normocephalic,ears are clear  EYES:PERR mass index is 24 kg/m². Guadalupe Riedel Recommended regular exercise. Labs ordered. No smoking or etoh. Do self breast exam. Pap sent. mammo up to date. Colon cancer screening to be done.      2. Encounter for screening for cervical cancer  - THINPREP PAP SMEAR B; Future

## 2021-08-17 LAB — HPV I/H RISK 1 DNA SPEC QL NAA+PROBE: NEGATIVE

## 2021-08-17 NOTE — PROGRESS NOTES
Spoke to patient, aware of results and recommendations. Patient voice understandings. Patient stated she has roughly 6 beers a day depending on her work week.

## 2021-09-30 ENCOUNTER — TELEPHONE (OUTPATIENT)
Dept: INTERNAL MEDICINE CLINIC | Facility: CLINIC | Age: 51
End: 2021-09-30

## 2021-11-06 ENCOUNTER — PATIENT MESSAGE (OUTPATIENT)
Dept: INTERNAL MEDICINE CLINIC | Facility: CLINIC | Age: 51
End: 2021-11-06

## 2021-11-08 NOTE — TELEPHONE ENCOUNTER
From: Gerry Self  To: HILDA Song  Sent: 11/6/2021 9:18 AM CDT  Subject: Covid booster shot    How will I know if I should or can get a Covid booster shot?   Thank you,  Meche Williamson

## 2021-12-02 ENCOUNTER — TELEMEDICINE (OUTPATIENT)
Dept: INTERNAL MEDICINE CLINIC | Facility: CLINIC | Age: 51
End: 2021-12-02
Payer: COMMERCIAL

## 2021-12-02 VITALS — OXYGEN SATURATION: 98 % | TEMPERATURE: 99 F | HEIGHT: 64.5 IN | BODY MASS INDEX: 24 KG/M2 | HEART RATE: 79 BPM

## 2021-12-02 DIAGNOSIS — U07.1 COVID-19: Primary | ICD-10-CM

## 2021-12-02 PROCEDURE — 99213 OFFICE O/P EST LOW 20 MIN: CPT | Performed by: NURSE PRACTITIONER

## 2021-12-02 NOTE — PROGRESS NOTES
Virtual video [id number] Jasmeet Louise verbally consents to a Virtual/video Check-In visit on 12/02/21. Patient has been referred to the [facility] website at [email or web address] to review the yearly Consent to Treat document.     Patient understands an Lateral epicondylitis 2/3/96    R   • Lipid screening +2/9/11   • Palpitations 3/3/06, 1/27/11   • PMS (premenstrual syndrome) 3/2000   • Seasonal allergies    • Tendinitis 10/2002    R wrist   • Tennis elbow 4/98   • Viral syndrome 2/14/2005   • Jerod Gonzalez 08/16/2021      Lab Results   Component Value Date    TSH 1.490 08/16/2021          ASSESSMENT AND PLAN:   1. COVID-19  -Patient is with Covid infection. She tested positive on 12/1. Her symptoms started on Monday.   She does not have shortness of breath

## 2021-12-02 NOTE — PATIENT INSTRUCTIONS
Continue to monitor for shortness of breath and your oxygen level. If your oxygen level go goes below 90% or you feel short of breath go to the emergency room. If you cannot get there safely call 911 as needed. Continue supportive care and isolation. contagious during the first few days. It is spread through the air by coughing and sneezing. It may also be spread by direct contact (touching the sick person and then touching your own eyes, nose, or mouth).  Frequent handwashing will decrease risk of spre advice  Call your healthcare provider right away if any of these occur:  · Cough with lots of colored sputum (mucus)  · Severe headache; face, neck, or ear pain  · Difficulty swallowing due to throat pain  · Fever of 100.4°F (38°C) or higher, or as directe

## 2021-12-03 ENCOUNTER — TELEPHONE (OUTPATIENT)
Dept: INTERNAL MEDICINE CLINIC | Facility: CLINIC | Age: 51
End: 2021-12-03

## 2021-12-03 ENCOUNTER — TELEPHONE (OUTPATIENT)
Dept: CASE MANAGEMENT | Age: 51
End: 2021-12-03

## 2021-12-03 NOTE — TELEPHONE ENCOUNTER
Unable to populate [employer] questions. Spoke to pt. Pt's voice very hoarse. No coughing while on the phone. Temp: 97.5F oral  Pulse Ox: 98% RA  Pulse: 76  Respirations: 18  Exertion Level: Climbing Stairs  Pt states feeling okay.  Just si

## 2021-12-03 NOTE — TELEPHONE ENCOUNTER
HILDA Esparza  [facility] Clinical Staff  Can we please place this patient on the home monitoring program for now as she is very anxious regarding her Covid infection.  Thank you.

## 2021-12-28 ENCOUNTER — IMMUNIZATION (OUTPATIENT)
Dept: LAB | Facility: HOSPITAL | Age: 51
End: 2021-12-28
Attending: EMERGENCY MEDICINE
Payer: COMMERCIAL

## 2021-12-28 DIAGNOSIS — Z23 NEED FOR VACCINATION: Primary | ICD-10-CM

## 2021-12-28 PROCEDURE — 0004A SARSCOV2 VAC 30MCG/0.3ML IM: CPT

## 2022-01-12 ENCOUNTER — TELEPHONE (OUTPATIENT)
Dept: INTERNAL MEDICINE CLINIC | Facility: CLINIC | Age: 52
End: 2022-01-12

## 2022-02-16 ENCOUNTER — APPOINTMENT (OUTPATIENT)
Dept: OTHER | Facility: HOSPITAL | Age: 52
End: 2022-02-16
Attending: PREVENTIVE MEDICINE

## 2022-02-25 RX ORDER — SERTRALINE HYDROCHLORIDE 100 MG/1
TABLET, FILM COATED ORAL
Qty: 180 TABLET | Refills: 1 | Status: SHIPPED | OUTPATIENT
Start: 2022-02-25

## 2022-02-28 PROBLEM — M53.3 CHRONIC SI JOINT PAIN: Status: ACTIVE | Noted: 2022-02-28

## 2022-02-28 PROBLEM — Z72.89 HEAVY ALCOHOL USE: Status: ACTIVE | Noted: 2022-02-28

## 2022-02-28 PROBLEM — G89.29 CHRONIC SI JOINT PAIN: Status: ACTIVE | Noted: 2022-02-28

## 2022-02-28 PROBLEM — F10.90 HEAVY ALCOHOL USE: Status: ACTIVE | Noted: 2022-02-28

## 2022-02-28 PROBLEM — M54.50 CHRONIC BILATERAL LOW BACK PAIN, UNSPECIFIED WHETHER SCIATICA PRESENT: Status: ACTIVE | Noted: 2022-02-28

## 2022-02-28 PROBLEM — Z78.9 HEAVY ALCOHOL USE: Status: ACTIVE | Noted: 2022-02-28

## 2022-02-28 PROBLEM — R41.840 POOR CONCENTRATION: Status: ACTIVE | Noted: 2022-02-28

## 2022-02-28 PROBLEM — G89.29 CHRONIC BILATERAL LOW BACK PAIN, UNSPECIFIED WHETHER SCIATICA PRESENT: Status: ACTIVE | Noted: 2022-02-28

## 2022-03-08 ENCOUNTER — TELEPHONE (OUTPATIENT)
Dept: INTERNAL MEDICINE CLINIC | Facility: CLINIC | Age: 52
End: 2022-03-08

## 2022-04-05 PROBLEM — F33.0 MAJOR DEPRESSIVE DISORDER, RECURRENT, MILD (HCC): Status: ACTIVE | Noted: 2022-04-05

## 2022-04-05 PROBLEM — F41.1 GENERALIZED ANXIETY DISORDER: Status: ACTIVE | Noted: 2022-04-05

## 2022-04-05 PROBLEM — F33.0 MAJOR DEPRESSIVE DISORDER, RECURRENT, MILD: Status: ACTIVE | Noted: 2022-04-05

## 2022-04-22 ENCOUNTER — OFFICE VISIT (OUTPATIENT)
Dept: INTERNAL MEDICINE CLINIC | Facility: CLINIC | Age: 52
End: 2022-04-22
Payer: COMMERCIAL

## 2022-04-22 ENCOUNTER — HOSPITAL ENCOUNTER (OUTPATIENT)
Dept: GENERAL RADIOLOGY | Age: 52
Discharge: HOME | End: 2022-04-22
Attending: NURSE PRACTITIONER
Payer: COMMERCIAL

## 2022-04-22 VITALS
DIASTOLIC BLOOD PRESSURE: 60 MMHG | BODY MASS INDEX: 22.94 KG/M2 | SYSTOLIC BLOOD PRESSURE: 110 MMHG | TEMPERATURE: 98 F | HEART RATE: 81 BPM | OXYGEN SATURATION: 98 % | WEIGHT: 136 LBS | RESPIRATION RATE: 14 BRPM | HEIGHT: 64.5 IN

## 2022-04-22 DIAGNOSIS — G89.29 CHRONIC PAIN OF BOTH HIPS: ICD-10-CM

## 2022-04-22 DIAGNOSIS — M54.41 ACUTE BILATERAL LOW BACK PAIN WITH BILATERAL SCIATICA: ICD-10-CM

## 2022-04-22 DIAGNOSIS — M54.41 ACUTE BILATERAL LOW BACK PAIN WITH BILATERAL SCIATICA: Primary | ICD-10-CM

## 2022-04-22 DIAGNOSIS — M25.551 CHRONIC PAIN OF BOTH HIPS: ICD-10-CM

## 2022-04-22 DIAGNOSIS — Z12.31 ENCOUNTER FOR SCREENING MAMMOGRAM FOR MALIGNANT NEOPLASM OF BREAST: ICD-10-CM

## 2022-04-22 DIAGNOSIS — M25.552 CHRONIC PAIN OF BOTH HIPS: ICD-10-CM

## 2022-04-22 DIAGNOSIS — M54.42 ACUTE BILATERAL LOW BACK PAIN WITH BILATERAL SCIATICA: ICD-10-CM

## 2022-04-22 DIAGNOSIS — M54.42 ACUTE BILATERAL LOW BACK PAIN WITH BILATERAL SCIATICA: Primary | ICD-10-CM

## 2022-04-22 DIAGNOSIS — F32.A ANXIETY AND DEPRESSION: ICD-10-CM

## 2022-04-22 DIAGNOSIS — F41.9 ANXIETY AND DEPRESSION: ICD-10-CM

## 2022-04-22 PROCEDURE — 73523 X-RAY EXAM HIPS BI 5/> VIEWS: CPT | Performed by: NURSE PRACTITIONER

## 2022-04-22 PROCEDURE — 3078F DIAST BP <80 MM HG: CPT | Performed by: NURSE PRACTITIONER

## 2022-04-22 PROCEDURE — 99214 OFFICE O/P EST MOD 30 MIN: CPT | Performed by: NURSE PRACTITIONER

## 2022-04-22 PROCEDURE — 3008F BODY MASS INDEX DOCD: CPT | Performed by: NURSE PRACTITIONER

## 2022-04-22 PROCEDURE — 3074F SYST BP LT 130 MM HG: CPT | Performed by: NURSE PRACTITIONER

## 2022-04-22 PROCEDURE — 72100 X-RAY EXAM L-S SPINE 2/3 VWS: CPT | Performed by: NURSE PRACTITIONER

## 2022-04-22 RX ORDER — MELOXICAM 15 MG/1
15 TABLET ORAL DAILY
Qty: 90 TABLET | Refills: 0 | Status: SHIPPED | OUTPATIENT
Start: 2022-04-22

## 2022-04-22 NOTE — PATIENT INSTRUCTIONS
Get your xrays completed    See ortho    Send in your stool card    Get your lab done.  You do not have to be fasting    Get your mammogram done    Follow up as needed or when routine care is due

## 2022-05-09 ENCOUNTER — TELEPHONE (OUTPATIENT)
Dept: INTERNAL MEDICINE CLINIC | Facility: CLINIC | Age: 52
End: 2022-05-09

## 2022-07-11 ENCOUNTER — IMMUNIZATION (OUTPATIENT)
Dept: LAB | Age: 52
End: 2022-07-11
Attending: EMERGENCY MEDICINE
Payer: COMMERCIAL

## 2022-07-11 DIAGNOSIS — Z23 NEED FOR VACCINATION: Primary | ICD-10-CM

## 2022-07-11 PROCEDURE — 0054A SARSCOV2 VAC 30MCG TRS SUCR: CPT

## 2022-07-29 ENCOUNTER — TELEPHONE (OUTPATIENT)
Dept: INTERNAL MEDICINE CLINIC | Facility: CLINIC | Age: 52
End: 2022-07-29

## 2022-08-08 ENCOUNTER — OFFICE VISIT (OUTPATIENT)
Dept: INTERNAL MEDICINE CLINIC | Facility: CLINIC | Age: 52
End: 2022-08-08
Payer: COMMERCIAL

## 2022-08-08 VITALS
SYSTOLIC BLOOD PRESSURE: 92 MMHG | HEIGHT: 64.4 IN | OXYGEN SATURATION: 97 % | DIASTOLIC BLOOD PRESSURE: 72 MMHG | WEIGHT: 132.63 LBS | BODY MASS INDEX: 22.37 KG/M2 | TEMPERATURE: 97 F | RESPIRATION RATE: 14 BRPM | HEART RATE: 78 BPM

## 2022-08-08 DIAGNOSIS — M79.641 BILATERAL HAND PAIN: Primary | ICD-10-CM

## 2022-08-08 DIAGNOSIS — M79.642 BILATERAL HAND PAIN: Primary | ICD-10-CM

## 2022-08-08 DIAGNOSIS — Z13.220 SCREENING FOR CHOLESTEROL LEVEL: ICD-10-CM

## 2022-08-08 DIAGNOSIS — Z00.00 PHYSICAL EXAM: ICD-10-CM

## 2022-08-08 DIAGNOSIS — Z13.29 SCREENING FOR THYROID DISORDER: ICD-10-CM

## 2022-08-08 NOTE — PATIENT INSTRUCTIONS
Get your labs done after Aug 16th. You should be fasting for at least 10 hours. If you take a multivitamin with Biotin or any biotin product it should be held for 3 days prior to getting your labs done. Get your xrays completed    Tylenol and topical voltaren gel as needed. Ice and rest. If not effective take meloxicam with food and do not lay down for an hour.      Follow up in 1 month for your annual physical or sooner as needed

## 2022-08-15 ENCOUNTER — PATIENT MESSAGE (OUTPATIENT)
Dept: INTERNAL MEDICINE CLINIC | Facility: CLINIC | Age: 52
End: 2022-08-15

## 2022-08-15 NOTE — TELEPHONE ENCOUNTER
From: Eden Samuel  To: HILDA Smith  Sent: 8/15/2022 11:49 AM CDT  Subject: Blood tests     Amanda Jones wanted me to do some blood tests having to do with my hand pain. How do I go about doing That?   Thank you

## 2022-09-12 LAB
ABSOLUTE BASOPHILS: 40 CELLS/UL (ref 0–200)
ABSOLUTE EOSINOPHILS: 158 CELLS/UL (ref 15–500)
ABSOLUTE LYMPHOCYTES: 1404 CELLS/UL (ref 850–3900)
ABSOLUTE MONOCYTES: 409 CELLS/UL (ref 200–950)
ABSOLUTE NEUTROPHILS: 2389 CELLS/UL (ref 1500–7800)
ALBUMIN/GLOBULIN RATIO: 1.8 (CALC) (ref 1–2.5)
ALBUMIN: 4.4 G/DL (ref 3.6–5.1)
ALKALINE PHOSPHATASE: 80 U/L (ref 37–153)
ALT: 14 U/L (ref 6–29)
ANA SCREEN, IFA: NEGATIVE
AST: 15 U/L (ref 10–35)
BASOPHILS: 0.9 %
BILIRUBIN, TOTAL: 0.5 MG/DL (ref 0.2–1.2)
BUN: 13 MG/DL (ref 7–25)
CALCIUM: 9.8 MG/DL (ref 8.6–10.4)
CARBON DIOXIDE: 29 MMOL/L (ref 20–32)
CHLORIDE: 104 MMOL/L (ref 98–110)
CHOL/HDLC RATIO: 1.9 (CALC)
CHOLESTEROL, TOTAL: 178 MG/DL
CREATININE: 0.58 MG/DL (ref 0.5–1.03)
CYCLIC CITRULLINATED$PEPTIDE (CCP) AB (IGG): <16 UNITS
EGFR: 107 ML/MIN/1.73M2
EOSINOPHILS: 3.6 %
GLOBULIN: 2.5 G/DL (CALC) (ref 1.9–3.7)
GLUCOSE: 96 MG/DL (ref 65–99)
HDL CHOLESTEROL: 94 MG/DL
HEMATOCRIT: 40.9 % (ref 35–45)
HEMOGLOBIN: 13.8 G/DL (ref 11.7–15.5)
LDL-CHOLESTEROL: 70 MG/DL (CALC)
LYMPHOCYTES: 31.9 %
MCH: 33.7 PG (ref 27–33)
MCHC: 33.7 G/DL (ref 32–36)
MCV: 100 FL (ref 80–100)
MONOCYTES: 9.3 %
MPV: 11.7 FL (ref 7.5–12.5)
NEUTROPHILS: 54.3 %
NON-HDL CHOLESTEROL: 84 MG/DL (CALC)
PLATELET COUNT: 221 THOUSAND/UL (ref 140–400)
POTASSIUM: 4 MMOL/L (ref 3.5–5.3)
PROTEIN, TOTAL: 6.9 G/DL (ref 6.1–8.1)
RDW: 11.6 % (ref 11–15)
RED BLOOD CELL COUNT: 4.09 MILLION/UL (ref 3.8–5.1)
RHEUMATOID FACTOR: <14 IU/ML
SODIUM: 139 MMOL/L (ref 135–146)
TRIGLYCERIDES: 62 MG/DL
TSH W/REFLEX TO FT4: 1.02 MIU/L
URIC ACID: 3.6 MG/DL (ref 2.5–7)
WHITE BLOOD CELL COUNT: 4.4 THOUSAND/UL (ref 3.8–10.8)

## 2022-09-21 ENCOUNTER — OFFICE VISIT (OUTPATIENT)
Dept: INTERNAL MEDICINE CLINIC | Facility: CLINIC | Age: 52
End: 2022-09-21

## 2022-09-21 VITALS
OXYGEN SATURATION: 98 % | TEMPERATURE: 98 F | HEART RATE: 77 BPM | WEIGHT: 131 LBS | DIASTOLIC BLOOD PRESSURE: 68 MMHG | HEIGHT: 65 IN | RESPIRATION RATE: 20 BRPM | SYSTOLIC BLOOD PRESSURE: 100 MMHG | BODY MASS INDEX: 21.83 KG/M2

## 2022-09-21 DIAGNOSIS — Z13.220 SCREENING FOR CHOLESTEROL LEVEL: ICD-10-CM

## 2022-09-21 DIAGNOSIS — Z00.00 ENCOUNTER FOR ANNUAL PHYSICAL EXAM: Primary | ICD-10-CM

## 2022-09-21 DIAGNOSIS — Z13.29 SCREENING FOR THYROID DISORDER: ICD-10-CM

## 2022-09-21 PROBLEM — F10.90 HEAVY ALCOHOL USE: Status: RESOLVED | Noted: 2022-02-28 | Resolved: 2022-09-21

## 2022-09-21 PROBLEM — K90.41 NCGS (NON-CELIAC GLUTEN SENSITIVITY): Status: ACTIVE | Noted: 2022-09-21

## 2022-09-21 PROBLEM — Z72.89 HEAVY ALCOHOL USE: Status: RESOLVED | Noted: 2022-02-28 | Resolved: 2022-09-21

## 2022-09-21 PROBLEM — F33.0 MAJOR DEPRESSIVE DISORDER, RECURRENT, MILD: Status: RESOLVED | Noted: 2022-04-05 | Resolved: 2022-09-21

## 2022-09-21 PROBLEM — F33.0 MAJOR DEPRESSIVE DISORDER, RECURRENT, MILD (HCC): Status: RESOLVED | Noted: 2022-04-05 | Resolved: 2022-09-21

## 2022-09-21 PROCEDURE — 3074F SYST BP LT 130 MM HG: CPT | Performed by: NURSE PRACTITIONER

## 2022-09-21 PROCEDURE — 3008F BODY MASS INDEX DOCD: CPT | Performed by: NURSE PRACTITIONER

## 2022-09-21 PROCEDURE — 99396 PREV VISIT EST AGE 40-64: CPT | Performed by: NURSE PRACTITIONER

## 2022-09-21 PROCEDURE — 3078F DIAST BP <80 MM HG: CPT | Performed by: NURSE PRACTITIONER

## 2022-09-21 NOTE — PATIENT INSTRUCTIONS
Flu shot recommended. Covid booster recommended    Check with your insurance to verify coverage for the Shingrix vaccine for shingles. Also check to see where you can go to get the vaccine. Get your labs done in 1 year. Call our office for orders before your physical. You should be fasting for at least 10 hours. If you take a multivitamin with Biotin or any biotin product it should be held for 3 days prior to getting your labs done. Go to the ER for any emergent symptoms. If you cannot get there safely call 911.      Get mammogram done    Follow up in 1 year or sooner as needed

## 2022-09-26 ENCOUNTER — PATIENT MESSAGE (OUTPATIENT)
Dept: INTERNAL MEDICINE CLINIC | Facility: CLINIC | Age: 52
End: 2022-09-26

## 2022-09-26 DIAGNOSIS — Z12.11 SCREENING FOR COLON CANCER: Primary | ICD-10-CM

## 2022-09-27 NOTE — TELEPHONE ENCOUNTER
From: Marissa Mario  To: HILDA Harris  Sent: 9/26/2022 12:10 PM CDT  Subject: Cologaurd    I checked with my insurance and Cologaurd is covered.     Thank you,  Rosalind Bob

## 2022-09-27 NOTE — TELEPHONE ENCOUNTER
Per Roc Carr, APRN, faxed pt signed Cologuard Order Requisition Form to [employer]. Also faxed pt ID, Insurance Card and Face Sheet. Fax#[phone]. Rec'd fax confirmation.

## 2022-09-28 ENCOUNTER — MED REC SCAN ONLY (OUTPATIENT)
Dept: INTERNAL MEDICINE CLINIC | Facility: CLINIC | Age: 52
End: 2022-09-28

## 2022-10-01 ENCOUNTER — PATIENT MESSAGE (OUTPATIENT)
Dept: INTERNAL MEDICINE CLINIC | Facility: CLINIC | Age: 52
End: 2022-10-01

## 2022-10-01 DIAGNOSIS — B00.9 HERPES: Primary | ICD-10-CM

## 2022-10-03 RX ORDER — VALACYCLOVIR HYDROCHLORIDE 500 MG/1
1000 TABLET, FILM COATED ORAL 2 TIMES DAILY
Qty: 20 TABLET | Refills: 0 | Status: SHIPPED | OUTPATIENT
Start: 2022-10-03 | End: 2022-10-08

## 2022-10-03 NOTE — TELEPHONE ENCOUNTER
Please see pt message below to advise, noted pt received valcyclovior in the past     valACYclovir HCl 500 MG Oral Tab     Last OV relevant to medication: 09/21/2022 PE  Last refill date: 03/31/2020   #/refills: 90-0  Pt taking PRN  When pt was asked to return for OV: Follow-up in 1 year or sooner as needed  Upcoming appt/reason: n/a  Was pt informed of any over due labs: n/a

## 2022-10-03 NOTE — TELEPHONE ENCOUNTER
From: Abraham Mascorro  To: HILDA Zhang  Sent: 10/1/2022 3:55 PM CDT  Subject: Herpes    Hello. After telling Candis Anaya I hardly ever get a herpes outbreak, I jinxed myself. I used to take something right when the outbreak starts to shorten the time. Could I get a prescription for this?   Thank you,  Valdo Barton

## 2022-10-31 ENCOUNTER — IMMUNIZATION (OUTPATIENT)
Dept: LAB | Age: 52
End: 2022-10-31
Attending: EMERGENCY MEDICINE
Payer: COMMERCIAL

## 2022-10-31 DIAGNOSIS — Z23 NEED FOR VACCINATION: Primary | ICD-10-CM

## 2022-10-31 PROCEDURE — 0124A SARSCOV2 VAC BVL 30MCG/0.3ML: CPT

## 2022-11-18 ENCOUNTER — TELEPHONE (OUTPATIENT)
Dept: INTERNAL MEDICINE CLINIC | Facility: CLINIC | Age: 52
End: 2022-11-18

## 2022-11-18 NOTE — TELEPHONE ENCOUNTER
Negative cologuard from [employer] completed 11/12/22   per Moe Moran ov note 9/21/22:  Colon cancer screening discussed and will check on Cologuard coverage instead of colonoscopy  Updated hm   To ashley for review

## 2022-11-28 ENCOUNTER — PATIENT MESSAGE (OUTPATIENT)
Dept: INTERNAL MEDICINE CLINIC | Facility: CLINIC | Age: 52
End: 2022-11-28

## 2022-11-29 NOTE — TELEPHONE ENCOUNTER
From: Elena Curiel  To: HILDA Hannon  Sent: 11/28/2022 8:39 PM CST  Subject: Cologaurd results    I sent a message about 6 days ago concerning my Cologaurd results. Can you please let me know the results.   Thank you

## 2023-01-11 ENCOUNTER — HOSPITAL ENCOUNTER (OUTPATIENT)
Dept: MAMMOGRAPHY | Age: 53
Discharge: HOME | End: 2023-01-11
Attending: NURSE PRACTITIONER
Payer: COMMERCIAL

## 2023-01-11 DIAGNOSIS — Z12.31 ENCOUNTER FOR SCREENING MAMMOGRAM FOR MALIGNANT NEOPLASM OF BREAST: ICD-10-CM

## 2023-01-11 PROCEDURE — 77063 BREAST TOMOSYNTHESIS BI: CPT | Performed by: NURSE PRACTITIONER

## 2023-01-11 PROCEDURE — 77067 SCR MAMMO BI INCL CAD: CPT | Performed by: NURSE PRACTITIONER

## 2023-05-05 ENCOUNTER — PATIENT MESSAGE (OUTPATIENT)
Dept: INTERNAL MEDICINE CLINIC | Facility: CLINIC | Age: 53
End: 2023-05-05

## 2023-05-05 NOTE — TELEPHONE ENCOUNTER
I am okay taking these over. I do want her to have a med check with me within the next few weeks. Can be VV if helpful. If she needs a refill this moment give her 30 days on each and then I will refill more after the med check. Thanks.

## 2023-05-05 NOTE — TELEPHONE ENCOUNTER
From: Minoo Li  To: HILDA Phillips  Sent: 5/5/2023 7:33 AM CDT  Subject: Sertraline    I do not see Radha Tran any longer. Can I return my perscription refills through [facility] as previously filled?     Thank you,  Danyel Fisher

## 2023-05-05 NOTE — TELEPHONE ENCOUNTER
Are you okay with taking over sertraline and buspirone refills? Both last refilled for 60/1 on 4/5/23. Thanks!

## 2023-08-09 ENCOUNTER — PATIENT MESSAGE (OUTPATIENT)
Dept: INTERNAL MEDICINE CLINIC | Facility: CLINIC | Age: 53
End: 2023-08-09

## 2023-08-10 RX ORDER — SERTRALINE HYDROCHLORIDE 100 MG/1
200 TABLET, FILM COATED ORAL DAILY
Qty: 60 TABLET | Refills: 0 | Status: SHIPPED | OUTPATIENT
Start: 2023-08-10

## 2023-08-10 NOTE — TELEPHONE ENCOUNTER
From: Eden Samuel  To: HILDA Smith  Sent: 8/9/2023 8:15 PM CDT  Subject: Sertraline fefill    Hello. I thought I could get my Sertraline refilled when I have my annual physical, which I thought was this month, but I have to wait until the end of September. Can I get a refill to tide me over until my appointment on the September 29th?     Thank you,  Viri Cornejo

## 2023-08-10 NOTE — TELEPHONE ENCOUNTER
Last OV relevant to medication: 9/21/22 PE   Last refill date: 4/5/22   60  #/refills: 1 By Stacie Sanon NP   When pt was asked to return for OV: 1 year   Upcoming appt/reason:   Future Appointments   Date Time Provider Madison Orellana   9/29/2023 11:40 AM Cate Birch, APRN [facility]       Was pt informed of any over due labs: n/a   Lab Results   Component Value Date    GLU 96 09/09/2022    BUN 13 09/09/2022    BUNCREA NOT APPLICABLE 79/07/6762    CREATSERUM 0.58 09/09/2022    ANIONGAP 5 08/16/2021     10/01/2014    GFRNAA 107 08/16/2021    GFRAA 123 08/16/2021    CA 9.8 09/09/2022    OSMOCALC 285 08/16/2021    ALKPHO 80 09/09/2022    AST 15 09/09/2022    ALT 14 09/09/2022    BILT 0.5 09/09/2022    TP 6.9 09/09/2022    ALB 4.4 09/09/2022    GLOBULIN 2.5 09/09/2022    AGRATIO 1.8 09/09/2022     09/09/2022    K 4.0 09/09/2022     09/09/2022    CO2 29 09/09/2022     Ok to prescribe?  Rx pended

## 2023-09-29 ENCOUNTER — OFFICE VISIT (OUTPATIENT)
Dept: INTERNAL MEDICINE CLINIC | Facility: CLINIC | Age: 53
End: 2023-09-29
Payer: COMMERCIAL

## 2023-09-29 VITALS
OXYGEN SATURATION: 98 % | TEMPERATURE: 98 F | WEIGHT: 129.31 LBS | BODY MASS INDEX: 22.63 KG/M2 | SYSTOLIC BLOOD PRESSURE: 110 MMHG | DIASTOLIC BLOOD PRESSURE: 70 MMHG | RESPIRATION RATE: 16 BRPM | HEART RATE: 76 BPM | HEIGHT: 63.5 IN

## 2023-09-29 DIAGNOSIS — Z13.29 SCREENING FOR THYROID DISORDER: ICD-10-CM

## 2023-09-29 DIAGNOSIS — Z00.00 ENCOUNTER FOR ANNUAL PHYSICAL EXAM: Primary | ICD-10-CM

## 2023-09-29 DIAGNOSIS — Z13.220 SCREENING FOR CHOLESTEROL LEVEL: ICD-10-CM

## 2023-09-29 DIAGNOSIS — F32.A ANXIETY AND DEPRESSION: ICD-10-CM

## 2023-09-29 DIAGNOSIS — Z12.31 ENCOUNTER FOR SCREENING MAMMOGRAM FOR MALIGNANT NEOPLASM OF BREAST: ICD-10-CM

## 2023-09-29 DIAGNOSIS — F41.9 ANXIETY AND DEPRESSION: ICD-10-CM

## 2023-09-29 PROCEDURE — 99213 OFFICE O/P EST LOW 20 MIN: CPT | Performed by: NURSE PRACTITIONER

## 2023-09-29 PROCEDURE — 3074F SYST BP LT 130 MM HG: CPT | Performed by: NURSE PRACTITIONER

## 2023-09-29 PROCEDURE — 3008F BODY MASS INDEX DOCD: CPT | Performed by: NURSE PRACTITIONER

## 2023-09-29 PROCEDURE — 99396 PREV VISIT EST AGE 40-64: CPT | Performed by: NURSE PRACTITIONER

## 2023-09-29 PROCEDURE — 3078F DIAST BP <80 MM HG: CPT | Performed by: NURSE PRACTITIONER

## 2023-09-29 RX ORDER — SERTRALINE HYDROCHLORIDE 100 MG/1
200 TABLET, FILM COATED ORAL DAILY
Qty: 180 TABLET | Refills: 0 | Status: SHIPPED | OUTPATIENT
Start: 2023-09-29

## 2023-09-29 NOTE — PATIENT INSTRUCTIONS
COVID booster recommended     Check with your insurance to verify coverage for the Shingrix vaccine for shingles. Also check to see where you can go to get the vaccine. Get a price check at the pharmacy. Get your labs done. You should be fasting for at least 10 hours. If you take a multivitamin with Biotin or any biotin product it should be held for 3 days prior to getting your labs done.      Get your mammogram done when due    Follow up in 1 year or sooner as needed

## 2023-11-21 ENCOUNTER — IMMUNIZATION (OUTPATIENT)
Dept: LAB | Age: 53
End: 2023-11-21
Attending: EMERGENCY MEDICINE
Payer: COMMERCIAL

## 2023-11-21 DIAGNOSIS — Z23 NEED FOR VACCINATION: Primary | ICD-10-CM

## 2023-11-21 PROCEDURE — 90480 ADMN SARSCOV2 VAC 1/ONLY CMP: CPT

## 2023-12-20 LAB
ABSOLUTE BASOPHILS: 28 CELLS/UL (ref 0–200)
ABSOLUTE EOSINOPHILS: 138 CELLS/UL (ref 15–500)
ABSOLUTE LYMPHOCYTES: 1426 CELLS/UL (ref 850–3900)
ABSOLUTE MONOCYTES: 409 CELLS/UL (ref 200–950)
ABSOLUTE NEUTROPHILS: 2599 CELLS/UL (ref 1500–7800)
ALBUMIN/GLOBULIN RATIO: 1.9 (CALC) (ref 1–2.5)
ALBUMIN: 4.4 G/DL (ref 3.6–5.1)
ALKALINE PHOSPHATASE: 77 U/L (ref 37–153)
ALT: 12 U/L (ref 6–29)
AST: 15 U/L (ref 10–35)
BASOPHILS: 0.6 %
BILIRUBIN, TOTAL: 0.6 MG/DL (ref 0.2–1.2)
BUN: 7 MG/DL (ref 7–25)
CALCIUM: 9.8 MG/DL (ref 8.6–10.4)
CARBON DIOXIDE: 31 MMOL/L (ref 20–32)
CHLORIDE: 102 MMOL/L (ref 98–110)
CHOL/HDLC RATIO: 2 (CALC)
CHOLESTEROL, TOTAL: 189 MG/DL
CREATININE: 0.6 MG/DL (ref 0.5–1.03)
EGFR: 106 ML/MIN/1.73M2
EOSINOPHILS: 3 %
GLOBULIN: 2.3 G/DL (CALC) (ref 1.9–3.7)
GLUCOSE: 96 MG/DL (ref 65–99)
HDL CHOLESTEROL: 93 MG/DL
HEMATOCRIT: 38.9 % (ref 35–45)
HEMOGLOBIN: 13.4 G/DL (ref 11.7–15.5)
LDL-CHOLESTEROL: 82 MG/DL (CALC)
LYMPHOCYTES: 31 %
MCH: 34.1 PG (ref 27–33)
MCHC: 34.4 G/DL (ref 32–36)
MCV: 99 FL (ref 80–100)
MONOCYTES: 8.9 %
MPV: 11.7 FL (ref 7.5–12.5)
NEUTROPHILS: 56.5 %
NON-HDL CHOLESTEROL: 96 MG/DL (CALC)
PLATELET COUNT: 222 THOUSAND/UL (ref 140–400)
POTASSIUM: 4.7 MMOL/L (ref 3.5–5.3)
PROTEIN, TOTAL: 6.7 G/DL (ref 6.1–8.1)
RDW: 11.8 % (ref 11–15)
RED BLOOD CELL COUNT: 3.93 MILLION/UL (ref 3.8–5.1)
SODIUM: 138 MMOL/L (ref 135–146)
TRIGLYCERIDES: 59 MG/DL
TSH W/REFLEX TO FT4: 1.51 MIU/L
WHITE BLOOD CELL COUNT: 4.6 THOUSAND/UL (ref 3.8–10.8)

## 2024-01-02 DIAGNOSIS — F32.A ANXIETY AND DEPRESSION: ICD-10-CM

## 2024-01-02 DIAGNOSIS — F41.9 ANXIETY AND DEPRESSION: ICD-10-CM

## 2024-01-09 RX ORDER — SERTRALINE HYDROCHLORIDE 100 MG/1
200 TABLET, FILM COATED ORAL DAILY
Qty: 180 TABLET | Refills: 2 | Status: SHIPPED | OUTPATIENT
Start: 2024-01-09

## 2024-01-09 NOTE — TELEPHONE ENCOUNTER
Last OV relevant to medication: 9/29/23  Last refill date: 9/29/23 #180/refills: 0  When pt was asked to return for OV: 9/29/24  Upcoming appt/reason: No future appointments.  Was pt informed of any over due labs: rubina

## 2024-03-05 ENCOUNTER — PATIENT MESSAGE (OUTPATIENT)
Dept: INTERNAL MEDICINE CLINIC | Facility: CLINIC | Age: 54
End: 2024-03-05

## 2024-03-06 NOTE — TELEPHONE ENCOUNTER
From: Brandy Melchor  To: Amanda Birch  Sent: 3/5/2024 8:25 PM CST  Subject: Shingles vaccine     Hi there,  I'm wondering if I can get the shingles vaccine at the doctors office instead of the pharmacy?  Also, will I need to see the doctor before getting a 3 month refill on my Sertraline?    Thank you,  Brandy

## 2024-03-08 NOTE — TELEPHONE ENCOUNTER
Spoke with pharmacist, #180/2 on file, last pickup was for 30 day supply from a different doctor. Advised pt of this info and to let pharmacy know not to send further requests to previous prescriber.

## 2024-05-02 ENCOUNTER — HOSPITAL ENCOUNTER (OUTPATIENT)
Dept: GENERAL RADIOLOGY | Age: 54
Discharge: HOME | End: 2024-05-02
Attending: PHYSICIAN ASSISTANT

## 2024-05-02 ENCOUNTER — OCC HEALTH (OUTPATIENT)
Dept: OCCUPATIONAL MEDICINE | Age: 54
End: 2024-05-02
Attending: PHYSICIAN ASSISTANT

## 2024-05-02 DIAGNOSIS — M25.531 CHRONIC PAIN OF RIGHT WRIST: ICD-10-CM

## 2024-05-02 DIAGNOSIS — M79.642 CHRONIC HAND PAIN, LEFT: ICD-10-CM

## 2024-05-02 DIAGNOSIS — M25.532 CHRONIC WRIST PAIN, LEFT: Primary | ICD-10-CM

## 2024-05-02 DIAGNOSIS — G89.29 CHRONIC HAND PAIN, RIGHT: ICD-10-CM

## 2024-05-02 DIAGNOSIS — M25.532 CHRONIC WRIST PAIN, LEFT: ICD-10-CM

## 2024-05-02 DIAGNOSIS — G89.29 CHRONIC WRIST PAIN, LEFT: Primary | ICD-10-CM

## 2024-05-02 DIAGNOSIS — M79.641 CHRONIC HAND PAIN, RIGHT: ICD-10-CM

## 2024-05-02 DIAGNOSIS — G89.29 CHRONIC HAND PAIN, LEFT: ICD-10-CM

## 2024-05-02 DIAGNOSIS — G89.29 CHRONIC PAIN OF RIGHT WRIST: ICD-10-CM

## 2024-05-02 DIAGNOSIS — G89.29 CHRONIC WRIST PAIN, LEFT: ICD-10-CM

## 2024-05-02 PROCEDURE — 73110 X-RAY EXAM OF WRIST: CPT | Performed by: PHYSICIAN ASSISTANT

## 2024-05-02 PROCEDURE — 73130 X-RAY EXAM OF HAND: CPT | Performed by: PHYSICIAN ASSISTANT

## 2024-07-30 ENCOUNTER — HOSPITAL ENCOUNTER (OUTPATIENT)
Dept: MAMMOGRAPHY | Age: 54
Discharge: HOME | End: 2024-07-30
Attending: NURSE PRACTITIONER
Payer: COMMERCIAL

## 2024-07-30 DIAGNOSIS — Z12.31 ENCOUNTER FOR SCREENING MAMMOGRAM FOR MALIGNANT NEOPLASM OF BREAST: ICD-10-CM

## 2024-07-30 PROCEDURE — 77063 BREAST TOMOSYNTHESIS BI: CPT | Performed by: NURSE PRACTITIONER

## 2024-07-30 PROCEDURE — 77067 SCR MAMMO BI INCL CAD: CPT | Performed by: NURSE PRACTITIONER

## 2024-08-13 ENCOUNTER — TELEPHONE (OUTPATIENT)
Dept: INTERNAL MEDICINE CLINIC | Facility: CLINIC | Age: 54
End: 2024-08-13

## 2024-09-25 ENCOUNTER — TELEPHONE (OUTPATIENT)
Age: 54
End: 2024-09-25

## 2024-09-25 NOTE — TELEPHONE ENCOUNTER
Hello - I am reaching out from the [facility] Navigation department, following up on an order from your provider's office to assist in connecting you with resources for care. If you would like to discuss this further, please give us a call at [phone], or for more immediate assistance you can contact our 24-hour help line at [phone]. We look forward to hearing from you soon.

## 2024-10-11 ENCOUNTER — TELEPHONE (OUTPATIENT)
Age: 54
End: 2024-10-11

## 2024-10-11 ENCOUNTER — TELEPHONE (OUTPATIENT)
Dept: INTERNAL MEDICINE CLINIC | Facility: CLINIC | Age: 54
End: 2024-10-11

## 2024-10-11 NOTE — TELEPHONE ENCOUNTER
Hello,  Sorry I missed you - I am reaching out from the [facility] Navigation department, following up on an order from your provider's office to assist in connecting you with resources for care. If you would like to discuss this further, please give us a call back at [phone], or for more immediate assistance you can contact our 24-hour help line at [phone]. We look forward to hearing from you soon.

## 2024-10-11 NOTE — TELEPHONE ENCOUNTER
Good Morning,     I left messages with my contact information and have not heard back from the patient. In my last message I also provided the [facility] 24/7 Helpline number just in case [phone]. I am closing the order at this time. Please feel free to re-refer the patient for navigation as needed.       Please let me know if there is anything else I can do.     Brandy [facility]   Behavioral Health Navigator   [facility]   24/7 Crisis Line [phone]

## 2024-10-21 ENCOUNTER — TELEPHONE (OUTPATIENT)
Age: 54
End: 2024-10-21

## 2024-10-25 ENCOUNTER — OFFICE VISIT (OUTPATIENT)
Dept: INTERNAL MEDICINE CLINIC | Facility: CLINIC | Age: 54
End: 2024-10-25
Payer: COMMERCIAL

## 2024-10-25 VITALS
SYSTOLIC BLOOD PRESSURE: 110 MMHG | HEART RATE: 72 BPM | DIASTOLIC BLOOD PRESSURE: 62 MMHG | WEIGHT: 135.88 LBS | HEIGHT: 63.78 IN | BODY MASS INDEX: 23.48 KG/M2 | OXYGEN SATURATION: 97 % | TEMPERATURE: 98 F | RESPIRATION RATE: 16 BRPM

## 2024-10-25 DIAGNOSIS — F32.A ANXIETY AND DEPRESSION: ICD-10-CM

## 2024-10-25 DIAGNOSIS — R71.8 ELEVATED MCV: ICD-10-CM

## 2024-10-25 DIAGNOSIS — Z12.31 ENCOUNTER FOR SCREENING MAMMOGRAM FOR BREAST CANCER: ICD-10-CM

## 2024-10-25 DIAGNOSIS — Z12.83 SCREENING FOR SKIN CANCER: ICD-10-CM

## 2024-10-25 DIAGNOSIS — Z00.00 ENCOUNTER FOR ANNUAL PHYSICAL EXAM: Primary | ICD-10-CM

## 2024-10-25 DIAGNOSIS — F41.9 ANXIETY AND DEPRESSION: ICD-10-CM

## 2024-10-25 RX ORDER — SERTRALINE HYDROCHLORIDE 100 MG/1
200 TABLET, FILM COATED ORAL DAILY
Qty: 180 TABLET | Refills: 0 | Status: SHIPPED | OUTPATIENT
Start: 2024-10-25

## 2024-10-25 NOTE — PATIENT INSTRUCTIONS
COVID vaccine recommended    Lower your alcohol as able    Get your labs done in 3 months. You should be fasting for at least 10 hours. If you take a multivitamin with Biotin or any biotin product it should be held for 3 days prior to getting your labs done.      See psychiatry as planned.     Continue therapy    Get your mammogram when due    Make an apt to see dermatology     Follow up in 1 year or sooner as needed

## 2024-10-25 NOTE — PROGRESS NOTES
CHIEF COMPLAINT       Annual Physical Exam, med check    HPI:   Brandy Melchor is a 54 year old female who presents for a complete physical exam, med check.     Wt Readings from Last 6 Encounters:   10/25/24 135 lb 14.4 oz (61.6 kg)   09/20/24 135 lb (61.2 kg)   09/29/23 129 lb 4.8 oz (58.7 kg)   09/21/22 131 lb (59.4 kg)   08/08/22 132 lb 9.6 oz (60.1 kg)   04/22/22 136 lb (61.7 kg)     Body mass index is 23.49 kg/m².     Diet is fair and exercise is good. Vaccines reviewed. Wearing seat belt and no texting and driving. Feels safe at home. Pap UTD. Mammo to be ordered. Colon cancer screening UTD. No smoking. Some alcohol- about 7 drinks in a week. No skin concerns.  Labs to be reviewed.     Anxiety and depression- on sertraline. No SE. Works okay. Seeing a therapist. No SI or HI. Has an apt with psych to discuss ADHD.    MCV high. Drinks alcohol.    CHOLESTEROL, TOTAL (mg/dL)   Date Value   10/22/2024 197   12/19/2023 189   09/09/2022 178     HDL CHOLESTEROL (mg/dL)   Date Value   10/22/2024 97   12/19/2023 93   09/09/2022 94     LDL-CHOLESTEROL (mg/dL (calc))   Date Value   10/22/2024 86   12/19/2023 82   09/09/2022 70     AST (U/L)   Date Value   10/22/2024 16   12/19/2023 15   09/09/2022 15     ALT (U/L)   Date Value   10/22/2024 13   12/19/2023 12   09/09/2022 14        Current Outpatient Medications   Medication Sig Dispense Refill    hydrOXYzine 25 MG Oral Tab Take 1-2 tablets (25-50 mg total) by mouth every 6 (six) hours as needed for Anxiety. 30 tablet 0    ALPRAZolam (XANAX) 0.25 MG Oral Tab Take 1-2 tablets (0.25-0.5 mg total) by mouth 2 (two) times daily as needed for Sleep or Anxiety. 10 tablet 0    sertraline 100 MG Oral Tab Take 2 tablets (200 mg total) by mouth daily. 180 tablet 2      No Active Allergies   Past Medical History:    Abnormal iron saturation    elevated    Abnormal pap    HR-HPV-ClNl    Anemia    Anxiety    Arthritis    Bilateral hip pain    COVID-19    Depression    Gluten  intolerance    Hemochromatosis    homozygous for C282Y mutation    IBS (irritable bowel syndrome)    Labyrinthitis    Lactose intolerance    Seasonal allergies      Past Surgical History:   Procedure Laterality Date    Dental surgery procedure      wisdom teeth    Other surgical history  2003    Colposcopy    Other surgical history  2014    Procedure: LOOP ELECTRO SURGICAL EXCISION;  Surgeon: Bromberger, Audrey, MD;  Location:  MAIN OR      Family History   Problem Relation Age of Onset    Other (leukemia) Father 60    Cancer Father     Genetic Disease Father     Other (hemochromatosis) Brother         x2    Breast Cancer Paternal Grandmother     Other (Glutin Intol) Mother     Depression Mother     Genetic Disease Mother       Social History:   Social History     Socioeconomic History    Marital status: Single   Tobacco Use    Smoking status: Former     Current packs/day: 0.00     Average packs/day: 1 pack/day for 20.0 years (20.0 ttl pk-yrs)     Types: Cigarettes     Start date: 1983     Quit date: 2003     Years since quittin.8    Smokeless tobacco: Never   Vaping Use    Vaping status: Never Used   Substance and Sexual Activity    Alcohol use: Yes     Alcohol/week: 12.0 standard drinks of alcohol     Types: 12 Cans of beer per week    Drug use: No     Comment: quit    Sexual activity: Not Currently   Other Topics Concern    Caffeine Concern No    Stress Concern No    Weight Concern No    Special Diet Yes     Comment: I try to eat gluten free as much as possible    Exercise No    Seat Belt Yes     Social Drivers of Health      [facility]    Social Connections    Received from [facility], [facility]    Housing Stability          REVIEW OF SYSTEMS:   GENERAL: feels well otherwise  SKIN: no complaint of any unusual skin lesions  EYES: no complaint of blurred vision or double vision  HEENT: no  complaint of nasal congestion, sinus pain or ST  LUNGS: no complaint of shortness of breath with exertion  CARDIOVASCULAR: no complaint of chest pain on exertion  GI: no complaint of pain,denies heartburn  : No complains of dysuria or vaginal discharge  MUSCULOSKELETAL: no complaint of back pain  NEURO: no complaint of headaches  PSYCHE: see HPI  HEMATOLOGIC: denies hx of anemia    EXAM:   /62 (BP Location: Left arm, Patient Position: Sitting, Cuff Size: adult)   Pulse 72   Temp 97.8 °F (36.6 °C) (Temporal)   Resp 16   Ht 5' 3.78\" (1.62 m)   Wt 135 lb 14.4 oz (61.6 kg)   SpO2 97%   BMI 23.49 kg/m²   Body mass index is 23.49 kg/m².   GENERAL: well developed, well nourished,in no apparent distress  SKIN: no rashes, no suspicious lesions  HEENT: atraumatic, normocephalic, ears are clear  EYES:PERRLA, EOMI, conjunctiva are clear  NECK: supple,no adenopathy, no thyromegaly  BREAST: no dominant or suspicious mass, no nipple discharge  LUNGS: clear to auscultation  CARDIO: RRR without murmur  GI: no masses, HSM or tenderness  :introitus is normal,scant discharge,cervix is pink,no adnexal masses or tenderness   MUSCULOSKELETAL: No obvious joint swelling or deformity.   EXTREMITIES: no  edema or calve tenderness   NEURO: Oriented times three, cranial nerves are intact,motor and sensory are grossly intact    LABS:     Lab Results   Component Value Date    WBC 4.6 10/22/2024    RBC 4.16 10/22/2024    HGB 13.9 10/22/2024    HCT 42.5 10/22/2024    .2 (H) 10/22/2024    MCH 33.4 (H) 10/22/2024    MCHC 32.7 10/22/2024    RDW 11.6 10/22/2024     10/22/2024      Lab Results   Component Value Date    GLU 97 10/22/2024    BUN 10 10/22/2024    BUNCREA SEE NOTE: 10/22/2024    CREATSERUM 0.63 10/22/2024    ANIONGAP 5 08/16/2021     10/01/2014    GFRNAA 107 08/16/2021    GFRAA 123 08/16/2021    CA 9.4 10/22/2024    OSMOCALC 285 08/16/2021    ALKPHO 93 10/22/2024    AST 16 10/22/2024    ALT 13  10/22/2024    BILT 0.4 10/22/2024    TP 7.1 10/22/2024    ALB 4.6 10/22/2024    GLOBULIN 2.5 10/22/2024    AGRATIO 1.8 10/22/2024     10/22/2024    K 4.4 10/22/2024     10/22/2024    CO2 30 10/22/2024      Lab Results   Component Value Date    CHOLEST 197 10/22/2024    TRIG 48 10/22/2024    HDL 97 10/22/2024    LDL 86 10/22/2024    VLDL 7 08/16/2021    TCHDLRATIO 2.0 10/22/2024    NONHDLC 100 10/22/2024      Lab Results   Component Value Date    TSH 1.490 08/16/2021    TSHT4 1.73 10/22/2024         ASSESSMENT AND PLAN:   1. Encounter for annual physical exam  - Brandy Melchor is a 53 year old female who presents for a complete physical exam. Self breast exam explained.  Pt' s weight is Body mass index is 22.55 kg/m². Recommended regular exercise. Labs ordered. Mammo ordered. Vaccines reviewed. Colon screening UTD.     2. Anxiety and depression  - on sertraline. Doing fair. No SE. No SI or HI  - continue current medication  - continue therapy  - see psych as planned  - sertraline 100 MG Oral Tab; Take 2 tablets (200 mg total) by mouth daily.  Dispense: 180 tablet; Refill: 0    3. Elevated MCV  - lower alcohol  - repeat labs  - CBC [6399] [Q]  - B12 AND FOLATE [7065] [Q]    4. Screening for skin cancer  - DERM - INTERNAL    5. Encounter for screening mammogram for breast cancer  - [facility] ALEX 2D+3D SCREENING BILAT (CPT=77067/09309); Future        Follow up in 1 year or sooner as needed  The patient indicates understanding of these issues and agrees to the plan.

## 2025-08-11 ENCOUNTER — HOSPITAL ENCOUNTER (OUTPATIENT)
Dept: GENERAL RADIOLOGY | Age: 55
Discharge: HOME | End: 2025-08-11
Attending: NURSE PRACTITIONER

## 2025-08-11 ENCOUNTER — OFFICE VISIT (OUTPATIENT)
Dept: INTERNAL MEDICINE CLINIC | Facility: CLINIC | Age: 55
End: 2025-08-11

## 2025-08-11 VITALS
HEIGHT: 63.78 IN | RESPIRATION RATE: 16 BRPM | TEMPERATURE: 97 F | SYSTOLIC BLOOD PRESSURE: 112 MMHG | OXYGEN SATURATION: 97 % | WEIGHT: 128.63 LBS | BODY MASS INDEX: 22.23 KG/M2 | HEART RATE: 80 BPM | DIASTOLIC BLOOD PRESSURE: 62 MMHG

## 2025-08-11 DIAGNOSIS — F41.9 ANXIETY: ICD-10-CM

## 2025-08-11 DIAGNOSIS — F90.2 ATTENTION DEFICIT HYPERACTIVITY DISORDER (ADHD), COMBINED TYPE: ICD-10-CM

## 2025-08-11 DIAGNOSIS — M65.341 TRIGGER RING FINGER OF RIGHT HAND: ICD-10-CM

## 2025-08-11 DIAGNOSIS — G62.9 NEUROPATHY: ICD-10-CM

## 2025-08-11 DIAGNOSIS — M79.641 PAIN IN BOTH HANDS: Primary | ICD-10-CM

## 2025-08-11 DIAGNOSIS — M79.641 PAIN IN BOTH HANDS: ICD-10-CM

## 2025-08-11 DIAGNOSIS — M79.642 PAIN IN BOTH HANDS: ICD-10-CM

## 2025-08-11 DIAGNOSIS — M79.642 PAIN IN BOTH HANDS: Primary | ICD-10-CM

## 2025-08-11 DIAGNOSIS — L29.9 ITCHING: ICD-10-CM

## 2025-08-11 PROCEDURE — 3074F SYST BP LT 130 MM HG: CPT | Performed by: NURSE PRACTITIONER

## 2025-08-11 PROCEDURE — G2211 COMPLEX E/M VISIT ADD ON: HCPCS | Performed by: NURSE PRACTITIONER

## 2025-08-11 PROCEDURE — 73130 X-RAY EXAM OF HAND: CPT | Performed by: NURSE PRACTITIONER

## 2025-08-11 PROCEDURE — 99214 OFFICE O/P EST MOD 30 MIN: CPT | Performed by: NURSE PRACTITIONER

## 2025-08-11 PROCEDURE — 3008F BODY MASS INDEX DOCD: CPT | Performed by: NURSE PRACTITIONER

## 2025-08-11 PROCEDURE — 3078F DIAST BP <80 MM HG: CPT | Performed by: NURSE PRACTITIONER

## 2025-08-15 ENCOUNTER — TELEPHONE (OUTPATIENT)
Age: 55
End: 2025-08-15

## 2025-08-20 LAB
% SATURATION: 67 % (CALC) (ref 16–45)
ABSOLUTE BASOPHILS: 32 CELLS/UL (ref 0–200)
ABSOLUTE EOSINOPHILS: 140 CELLS/UL (ref 15–500)
ABSOLUTE LYMPHOCYTES: 1224 CELLS/UL (ref 850–3900)
ABSOLUTE MONOCYTES: 396 CELLS/UL (ref 200–950)
ABSOLUTE NEUTROPHILS: 2709 CELLS/UL (ref 1500–7800)
ALBUMIN/GLOBULIN RATIO: 2 (CALC) (ref 1–2.5)
ALBUMIN: 4.3 G/DL (ref 3.6–5.1)
ALKALINE PHOSPHATASE: 82 U/L (ref 37–153)
ALT: 12 U/L (ref 6–29)
ANA SCREEN, IFA: POSITIVE
AST: 14 U/L (ref 10–35)
BASOPHILS: 0.7 %
BILIRUBIN, TOTAL: 0.4 MG/DL (ref 0.2–1.2)
BUN: 10 MG/DL (ref 7–25)
C-REACTIVE PROTEIN: <3 MG/L
CALCIUM: 9.1 MG/DL (ref 8.6–10.4)
CARBON DIOXIDE: 29 MMOL/L (ref 20–32)
CHLORIDE: 104 MMOL/L (ref 98–110)
CREATININE: 0.55 MG/DL (ref 0.5–1.03)
EGFR: 107 ML/MIN/1.73M2
EOSINOPHILS: 3.1 %
FERRITIN: 42 NG/ML (ref 16–232)
FOLATE, SERUM: 12 NG/ML
GLOBULIN: 2.2 G/DL (CALC) (ref 1.9–3.7)
GLUCOSE: 93 MG/DL (ref 65–99)
HEMATOCRIT: 39.8 % (ref 35–45)
HEMOGLOBIN: 13.5 G/DL (ref 11.7–15.5)
HEPATITIS B VIRUS DNA: NOT DETECTED IU/ML
HEPATITIS B VIRUS DNA: NOT DETECTED LOG IU/ML
IRON BINDING CAPACITY: 211 MCG/DL (CALC) (ref 250–450)
IRON, TOTAL: 141 MCG/DL (ref 45–160)
LYME AB SCREEN: <0.9 INDEX
LYMPHOCYTES: 27.2 %
MCH: 34 PG (ref 27–33)
MCHC: 33.9 G/DL (ref 32–36)
MCV: 100.3 FL (ref 80–100)
MONOCYTES: 8.8 %
MPV: 11.3 FL (ref 7.5–12.5)
NEUTROPHILS: 60.2 %
PLATELET COUNT: 235 THOUSAND/UL (ref 140–400)
POTASSIUM: 4.2 MMOL/L (ref 3.5–5.3)
PROTEIN, TOTAL: 6.5 G/DL (ref 6.1–8.1)
RDW: 11.9 % (ref 11–15)
RED BLOOD CELL COUNT: 3.97 MILLION/UL (ref 3.8–5.1)
RHEUMATOID FACTOR: <10 IU/ML
SED RATE BY MODIFIED$WESTERGREN: 2 MM/H
SODIUM: 138 MMOL/L (ref 135–146)
TSH W/REFLEX TO FT4: 1.5 MIU/L (ref 0.4–4.5)
URIC ACID: 3.2 MG/DL (ref 2.5–7)
VITAMIN B12: 230 PG/ML (ref 200–1100)
VITAMIN D, 25-OH, TOTAL: 31 NG/ML (ref 30–100)
WHITE BLOOD CELL COUNT: 4.5 THOUSAND/UL (ref 3.8–10.8)

## (undated) DIAGNOSIS — F32.A ANXIETY AND DEPRESSION: ICD-10-CM

## (undated) DIAGNOSIS — F41.9 ANXIETY AND DEPRESSION: ICD-10-CM

## (undated) NOTE — LETTER
04/21/21        [address] [facility] [address] [id number]      Dear Sanaz Zhou,    This is  Friendly reminder to let you know you have outstanding lab work as listed below.  To complete your labs, please go to [facility] under Menu/Schedule An

## (undated) NOTE — LETTER
11/20/19        [facility] [address] [phone]      Dear Claire Schaumann,    [facility] records indicate that you have outstanding lab work and or testing that was ordered for you and has not yet been completed:  Orders Placed This Encounter

## (undated) NOTE — MR AVS SNAPSHOT
After Visit Summary   8/16/2021    Yael Luna    MRN: [id number]           Visit Information     Date & Time  8/16/2021  9:40 AM Provider  [facility]  [place] [facility]  Phone  [phone] it should be held for 3 days prior to getting your labs done. Send in your stool card    Follow up in one year or sooner as needed                     [facility] now offers Video Visits through [address] for adult and pediatric patients.   Video Visits are availa [facility]   Monday – Friday  10:00 am – 10:00 pm   Saturday – Sunday  10:00 am – 4:00 pm     [address]   Monday – Friday  4:00 pm – 10:00 pm   Saturday – Sunday  10:00 am – 4:00 pm  [facility]  E